# Patient Record
Sex: FEMALE
[De-identification: names, ages, dates, MRNs, and addresses within clinical notes are randomized per-mention and may not be internally consistent; named-entity substitution may affect disease eponyms.]

---

## 2019-06-03 PROCEDURE — 88305 TISSUE EXAM BY PATHOLOGIST: CPT | Performed by: RADIOLOGY

## 2019-06-03 PROCEDURE — 88342 IMHCHEM/IMCYTCHM 1ST ANTB: CPT | Performed by: RADIOLOGY

## 2019-06-03 PROCEDURE — 88360 TUMOR IMMUNOHISTOCHEM/MANUAL: CPT | Performed by: RADIOLOGY

## 2019-06-03 PROCEDURE — 88341 IMHCHEM/IMCYTCHM EA ADD ANTB: CPT | Performed by: RADIOLOGY

## 2019-06-21 ENCOUNTER — PRIOR ORIGINAL RECORDS (OUTPATIENT)
Dept: OTHER | Age: 59
End: 2019-06-21

## 2019-06-25 ENCOUNTER — NURSE NAVIGATOR ENCOUNTER (OUTPATIENT)
Dept: HEMATOLOGY/ONCOLOGY | Facility: HOSPITAL | Age: 59
End: 2019-06-25

## 2019-06-25 ENCOUNTER — OFFICE VISIT (OUTPATIENT)
Dept: SURGERY | Facility: CLINIC | Age: 59
End: 2019-06-25
Payer: MEDICAID

## 2019-06-25 ENCOUNTER — GENETICS ENCOUNTER (OUTPATIENT)
Dept: GENETICS | Facility: HOSPITAL | Age: 59
End: 2019-06-25
Attending: GENETIC COUNSELOR, MS
Payer: MEDICAID

## 2019-06-25 ENCOUNTER — TELEPHONE (OUTPATIENT)
Dept: HEMATOLOGY/ONCOLOGY | Facility: HOSPITAL | Age: 59
End: 2019-06-25

## 2019-06-25 VITALS
DIASTOLIC BLOOD PRESSURE: 79 MMHG | SYSTOLIC BLOOD PRESSURE: 135 MMHG | WEIGHT: 157 LBS | RESPIRATION RATE: 16 BRPM | HEART RATE: 58 BPM | OXYGEN SATURATION: 99 % | BODY MASS INDEX: 28 KG/M2

## 2019-06-25 DIAGNOSIS — R92.8 ABNORMAL MAMMOGRAM OF RIGHT BREAST: Primary | ICD-10-CM

## 2019-06-25 DIAGNOSIS — D05.11 BREAST NEOPLASM, TIS (DCIS), RIGHT: ICD-10-CM

## 2019-06-25 PROCEDURE — 99245 OFF/OP CONSLTJ NEW/EST HI 55: CPT | Performed by: SURGERY

## 2019-06-25 NOTE — TELEPHONE ENCOUNTER
Attempted to reach patient via language line (ID # [id number]) to discuss MRI appointment. Pt had requested Saturday appointment to be able to accommodate her work schedule. No availability this Saturday, but next Saturday July 6th is available.  MRI was schedu

## 2019-06-25 NOTE — PROGRESS NOTES
Met with patient and her son in clinic, patient's son translated. Introduced myself as the breast navigator nurse and explained my role and how I will work with all of the physicians involved in her care.  Explained the role of all of the physicians involve

## 2019-06-26 ENCOUNTER — TELEPHONE (OUTPATIENT)
Dept: HEMATOLOGY/ONCOLOGY | Facility: HOSPITAL | Age: 59
End: 2019-06-26

## 2019-06-26 NOTE — PROGRESS NOTES
Referring Provider: Dr. Daniel Fish          Reason for Referral:  Ms. Klaudia Jordan was referred for genetic counseling because of a new diagnosis of DCIS of the right breast at age 63. Ms. Katlyn Brewer is a 63year-old woman from [place].   She w Assessment:   Ms. Catrachito Zuniga meets NCCN guidelines for genetic testing for BRCA1/2 given a personal history of breast cancer, with a first degree relative with male  breast cancer. Genetic Testing (Panel):   The pros, cons, and limitations of genetic 50% chance of carrying the same variant. At-risk adults (>18) would have the option of pursuing targeted genetic testing at a reduced cost to clarify their cancer risks. Genetic test results have implications for the entire biological family.  Thus, it is r

## 2019-06-26 NOTE — H&P (VIEW-ONLY)
[facility]    Patient Name:  Dinesh Gresham   YOB: 1960   Gender:  Female   Appt Date:  6/25/2019   Provider:  Wilma Mcmahon MD   Insurance:  [address] [facility] This is a very pleasant 52-year-old female who is being evaluated today for the above reason. History of this present illness dates back to 5/9/2019 when the patient underwent bilateral screening mammograms with tomosynthesis.   Those returned to show the Vital Signs:  /79 (BP Location: Right arm, Patient Position: Sitting, Cuff Size: adult)   Pulse 58   Resp 16   Wt 71.2 kg (157 lb)   LMP 08/04/2012   SpO2 99%   BMI 28.03 kg/m²      Medications Reviewed:    Current Outpatient Medications:   •  Prav Psychiatric/Behavioral: Negative for confusion and agitation. Physical Examination:  Physical Exam    Constitutional: She is oriented to person, place, and time. She appears well-developed and well-nourished. HENT:   Head: Normocephalic.    Eyes: Pu was placed. Post biopsy mammogram, done on a dedicated unit, demonstrates the clip at the biopsy  site. Calcifications were identified in the tissue specimens.   *Using usual sterile technique and under stereotactic guidance, multiple core biopsies were obt -Largest focus of DCIS measures 8 mm (0.8 cm); approximately 30% of submitted tissue.         -Ductal carcinoma is associated with focal necrosis, microcalcifications and a radial sclerosing lesion and an intraductal papilloma.          -See comment. Methodology:         Immunohistochemical stains are performed on formalin-fixed, paraffin-embedded tissue sections. Deparaffinization, antigen retrieval, and staining utilizes the automated Leica Lugo III immunohistochemistry platform.   A proprietary, non Quantitative Immunohistochemistry:   Material:  Block B2  Population:   Tumor Cells     Antibody & Clone              Result  Interpretation   Estrogen Receptor   -   6F11 100 % Positive Cells Strong Positive   Progesterone Receptor   -   16 100 % Positive A- Labeled with the patient's name and medical record number, right breast stereotactic biopsy for calcifications at 11 o'clock, anterior depth, one container, gauge not specified, received in formalin: the specimen consists of multiple fragmented fibrofat We will present her case in our multidisciplinary tumor board next week. Crystal Young MD  [facility] General Surgical Oncology  [facility] 112  Pager [phone]  [email or web address]          Follow Up:  No follow-ups on file.        Electronicall

## 2019-06-26 NOTE — TELEPHONE ENCOUNTER
Assisted patient in re scheduling MRI, per her request. Scheduled for 7/1 at [place] location. Phoned pt with  line- ID # [id number]- confirmed time and location- the [place] location.  Contact information provided should the patient have any ot

## 2019-06-27 ENCOUNTER — TELEPHONE (OUTPATIENT)
Dept: HEMATOLOGY/ONCOLOGY | Facility: HOSPITAL | Age: 59
End: 2019-06-27

## 2019-06-27 NOTE — TELEPHONE ENCOUNTER
Spoke with [facility] release of information. They are currently still processing our request from 6/21 and will try to send the images on discs by 7/1.

## 2019-07-02 ENCOUNTER — TELEPHONE (OUTPATIENT)
Dept: SURGERY | Facility: CLINIC | Age: 59
End: 2019-07-02

## 2019-07-02 ENCOUNTER — DOCUMENTATION ONLY (OUTPATIENT)
Dept: SURGERY | Facility: CLINIC | Age: 59
End: 2019-07-02

## 2019-07-02 DIAGNOSIS — Z01.818 PREOP TESTING: ICD-10-CM

## 2019-07-02 DIAGNOSIS — D05.11 BREAST NEOPLASM, TIS (DCIS), RIGHT: Primary | ICD-10-CM

## 2019-07-02 NOTE — TELEPHONE ENCOUNTER
Aldoamanda Dunnbro, son called hoping to schedule surgery. Tentative date of 7-16-19 at [facility] given. He is aware Dr Sade Shukla is in [address] but I will discuss exact details of surgery and then update the pt.   I will call her using [employer]  [phone]

## 2019-07-03 ENCOUNTER — TELEPHONE (OUTPATIENT)
Dept: HEMATOLOGY/ONCOLOGY | Facility: HOSPITAL | Age: 59
End: 2019-07-03

## 2019-07-03 NOTE — TELEPHONE ENCOUNTER
Left a voicemail for patient's son, Daniel Rojas, regarding appointment with Dr. Zayra Griffin. Also phoned the patient's cell phone with the language line- ID # [id number].  Patient answered the phone and we discussed the date, time and location for appointment with

## 2019-07-09 ENCOUNTER — OFFICE VISIT (OUTPATIENT)
Dept: SURGERY | Facility: CLINIC | Age: 59
End: 2019-07-09
Payer: MEDICAID

## 2019-07-09 DIAGNOSIS — D05.11 DUCTAL CARCINOMA IN SITU (DCIS) OF RIGHT BREAST: Primary | ICD-10-CM

## 2019-07-09 PROCEDURE — 99203 OFFICE O/P NEW LOW 30 MIN: CPT | Performed by: SURGERY

## 2019-07-09 NOTE — PROGRESS NOTES
Surgeon: Dr. Padna Mauricio      Tel:  [phone]    Fax: [phone]     Surgery/Procedure: Immediate right breast tissue expander placement with acellular dermal matrix    Joint with Dr. Cherelle Villegas, 1.5 hours       Hospital:  [facility]: [address] [place] [address]
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
## 2019-07-09 NOTE — PROGRESS NOTES
PT given pre-op instructions for surgery via [employer] interpretor. PT was given surgical scrub wash and instructions. Pt instructed to see PCP for medical clearance prior to surgery, pt states she saw her PCP Dr. Arline Dc already for clearance.  Surgery to

## 2019-07-10 ENCOUNTER — GENETICS ENCOUNTER (OUTPATIENT)
Dept: HEMATOLOGY/ONCOLOGY | Facility: HOSPITAL | Age: 59
End: 2019-07-10

## 2019-07-10 DIAGNOSIS — D05.11 DUCTAL CARCINOMA IN SITU (DCIS) OF RIGHT BREAST: Primary | ICD-10-CM

## 2019-07-10 NOTE — PROGRESS NOTES
Referring Provider: Dr. Rigoberto Kumar          Reason for Referral:  Ms. Jona Bishop had [facility] Breast Cancer STAT panel genetic testing performed on 06/25/2019 because of a personal and family history of breast cancer.        Genetic Testing Resul

## 2019-07-12 ENCOUNTER — TELEPHONE (OUTPATIENT)
Dept: SURGERY | Facility: CLINIC | Age: 59
End: 2019-07-12

## 2019-07-12 NOTE — TELEPHONE ENCOUNTER
Called pt to schedule surgery using the [employer],  #[phone]  Dr Slade Wiggins is available for joint case on Wednesday 7-24-19, first case 0730  Lm on  to ; main dept # given as call back.

## 2019-07-12 NOTE — TELEPHONE ENCOUNTER
Son returned my call; confirmed OR at [facility] on 7/24/19. Verbalized understanding and agrees to this plan. Hospital address and main phone # given as reference.

## 2019-07-19 ENCOUNTER — TELEPHONE (OUTPATIENT)
Dept: SURGERY | Facility: CLINIC | Age: 59
End: 2019-07-19

## 2019-07-19 NOTE — TELEPHONE ENCOUNTER
Called pt to confirm she has seen new PCP for medical clearance. Call placed with Language Line #[phone], Jos Coyle.  unable to lm on vm as it was not functioning. Called other contact phone number, pt states she is too busy to talk to me now.   Re

## 2019-07-19 NOTE — TELEPHONE ENCOUNTER
Son returned my call stating he believed Dr Simran Nieves was going to speak with Dr Amilcar Garcia directly re: medical clearance. He gave me her phone number:  [phone]; I tried to call; no answer, no vm. Will call again on Monday.

## 2019-07-19 NOTE — TELEPHONE ENCOUNTER
Called pt again with language line,  #[phone], Enmanuel Rubio; lm on vm to cb on Monday. My direct call back # given. Per Lesvia's TE 7-9-19, pt reported medical clearance per Dr Adrián Kapadia. Nothing in Epic. Need to confirm with pt.

## 2019-07-22 NOTE — TELEPHONE ENCOUNTER
Called Dr Montez Del Rio office, spoke to Gilda greenwood who states she does have clearance and will fax it to this office asap. Received via fax, medical clearance. Will forward to PAT and Plastics, then to Scan. Confirmations received on both faxes above.   Ready for

## 2019-07-23 ENCOUNTER — HOSPITAL ENCOUNTER (OUTPATIENT)
Dept: NUCLEAR MEDICINE | Facility: HOSPITAL | Age: 59
Discharge: HOME | End: 2019-07-23
Attending: SURGERY
Payer: MEDICAID

## 2019-07-23 ENCOUNTER — TELEPHONE (OUTPATIENT)
Dept: SURGERY | Facility: CLINIC | Age: 59
End: 2019-07-23

## 2019-07-23 DIAGNOSIS — D05.11 BREAST NEOPLASM, TIS (DCIS), RIGHT: ICD-10-CM

## 2019-07-23 PROCEDURE — 78195 LYMPH SYSTEM IMAGING: CPT | Performed by: SURGERY

## 2019-07-23 RX ORDER — HEPARIN SODIUM 5000 [USP'U]/ML
5000 INJECTION, SOLUTION INTRAVENOUS; SUBCUTANEOUS EVERY 8 HOURS SCHEDULED
Status: CANCELLED | OUTPATIENT
Start: 2019-07-23

## 2019-07-23 NOTE — TELEPHONE ENCOUNTER
Received a call from Karishma Mas in [place] stating pt did not show for her lymphoscintigraphy today. Called pt using  #[phone], Haja Flowers lm on  URGENT for pt to cb. My direct call back # given.   Called home #; using same , states she is not

## 2019-07-24 ENCOUNTER — HOSPITAL ENCOUNTER (OUTPATIENT)
Facility: HOSPITAL | Age: 59
Discharge: HOME | End: 2019-07-25
Attending: SURGERY | Admitting: SURGERY
Payer: MEDICAID

## 2019-07-24 ENCOUNTER — ANESTHESIA EVENT (OUTPATIENT)
Dept: SURGERY | Facility: HOSPITAL | Age: 59
End: 2019-07-24
Payer: MEDICAID

## 2019-07-24 ENCOUNTER — ANESTHESIA (OUTPATIENT)
Dept: SURGERY | Facility: HOSPITAL | Age: 59
End: 2019-07-24
Payer: MEDICAID

## 2019-07-24 DIAGNOSIS — D05.11 DUCTAL CARCINOMA IN SITU (DCIS) OF RIGHT BREAST: Primary | ICD-10-CM

## 2019-07-24 PROBLEM — D05.90 BREAST CANCER IN SITU: Status: ACTIVE | Noted: 2019-07-24

## 2019-07-24 PROBLEM — Z90.11 ABSENCE OF BREAST, RIGHT: Status: ACTIVE | Noted: 2019-07-24

## 2019-07-24 LAB
ANION GAP SERPL CALC-SCNC: 9 MMOL/L (ref 0–18)
BUN BLD-MCNC: 15 MG/DL (ref 7–18)
BUN/CREAT SERPL: 18.3 (ref 10–20)
CALCIUM BLD-MCNC: 8.8 MG/DL (ref 8.5–10.1)
CHLORIDE SERPL-SCNC: 109 MMOL/L (ref 98–112)
CO2 SERPL-SCNC: 22 MMOL/L (ref 21–32)
CREAT BLD-MCNC: 0.82 MG/DL (ref 0.55–1.02)
GLUCOSE BLD-MCNC: 139 MG/DL (ref 70–99)
OSMOLALITY SERPL CALC.SUM OF ELEC: 293 MOSM/KG (ref 275–295)
PATIENT FASTING: YES
POTASSIUM SERPL-SCNC: 4 MMOL/L (ref 3.5–5.1)
SODIUM SERPL-SCNC: 140 MMOL/L (ref 136–145)

## 2019-07-24 PROCEDURE — 99204 OFFICE O/P NEW MOD 45 MIN: CPT | Performed by: HOSPITALIST

## 2019-07-24 PROCEDURE — 0HHT0NZ INSERTION OF TISSUE EXPANDER INTO RIGHT BREAST, OPEN APPROACH: ICD-10-PCS | Performed by: SURGERY

## 2019-07-24 PROCEDURE — 3E0W3KZ INTRODUCTION OF OTHER DIAGNOSTIC SUBSTANCE INTO LYMPHATICS, PERCUTANEOUS APPROACH: ICD-10-PCS | Performed by: SURGERY

## 2019-07-24 PROCEDURE — 07B50ZX EXCISION OF RIGHT AXILLARY LYMPHATIC, OPEN APPROACH, DIAGNOSTIC: ICD-10-PCS | Performed by: SURGERY

## 2019-07-24 PROCEDURE — 0HTT0ZZ RESECTION OF RIGHT BREAST, OPEN APPROACH: ICD-10-PCS | Performed by: SURGERY

## 2019-07-24 RX ORDER — HYDROCODONE BITARTRATE AND ACETAMINOPHEN 5; 325 MG/1; MG/1
1-2 TABLET ORAL EVERY 6 HOURS PRN
Status: DISCONTINUED | OUTPATIENT
Start: 2019-07-24 | End: 2019-07-25

## 2019-07-24 RX ORDER — FONDAPARINUX SODIUM 2.5 MG/.5ML
2.5 INJECTION SUBCUTANEOUS DAILY
Status: DISCONTINUED | OUTPATIENT
Start: 2019-07-25 | End: 2019-07-25

## 2019-07-24 RX ORDER — HYDROCODONE BITARTRATE AND ACETAMINOPHEN 5; 325 MG/1; MG/1
2 TABLET ORAL AS NEEDED
Status: COMPLETED | OUTPATIENT
Start: 2019-07-24 | End: 2019-07-24

## 2019-07-24 RX ORDER — HYDROMORPHONE HYDROCHLORIDE 1 MG/ML
0.6 INJECTION, SOLUTION INTRAMUSCULAR; INTRAVENOUS; SUBCUTANEOUS EVERY 5 MIN PRN
Status: DISCONTINUED | OUTPATIENT
Start: 2019-07-24 | End: 2019-07-24 | Stop reason: HOSPADM

## 2019-07-24 RX ORDER — ONDANSETRON 2 MG/ML
INJECTION INTRAMUSCULAR; INTRAVENOUS AS NEEDED
Status: DISCONTINUED | OUTPATIENT
Start: 2019-07-24 | End: 2019-07-24 | Stop reason: SURG

## 2019-07-24 RX ORDER — CEFAZOLIN SODIUM/WATER 2 G/20 ML
2 SYRINGE (ML) INTRAVENOUS ONCE
Status: COMPLETED | OUTPATIENT
Start: 2019-07-24 | End: 2019-07-24

## 2019-07-24 RX ORDER — ROCURONIUM BROMIDE 10 MG/ML
INJECTION, SOLUTION INTRAVENOUS AS NEEDED
Status: DISCONTINUED | OUTPATIENT
Start: 2019-07-24 | End: 2019-07-24 | Stop reason: SURG

## 2019-07-24 RX ORDER — MORPHINE SULFATE 4 MG/ML
4 INJECTION, SOLUTION INTRAMUSCULAR; INTRAVENOUS EVERY 10 MIN PRN
Status: DISCONTINUED | OUTPATIENT
Start: 2019-07-24 | End: 2019-07-24 | Stop reason: HOSPADM

## 2019-07-24 RX ORDER — DIPHENHYDRAMINE HCL 25 MG
25 CAPSULE ORAL EVERY 4 HOURS PRN
Status: DISCONTINUED | OUTPATIENT
Start: 2019-07-24 | End: 2019-07-25

## 2019-07-24 RX ORDER — MORPHINE SULFATE 4 MG/ML
2 INJECTION, SOLUTION INTRAMUSCULAR; INTRAVENOUS EVERY 10 MIN PRN
Status: DISCONTINUED | OUTPATIENT
Start: 2019-07-24 | End: 2019-07-24 | Stop reason: HOSPADM

## 2019-07-24 RX ORDER — ONDANSETRON 2 MG/ML
4 INJECTION INTRAMUSCULAR; INTRAVENOUS EVERY 6 HOURS PRN
Status: DISCONTINUED | OUTPATIENT
Start: 2019-07-24 | End: 2019-07-25

## 2019-07-24 RX ORDER — METOCLOPRAMIDE HYDROCHLORIDE 5 MG/ML
10 INJECTION INTRAMUSCULAR; INTRAVENOUS EVERY 6 HOURS PRN
Status: DISCONTINUED | OUTPATIENT
Start: 2019-07-24 | End: 2019-07-25

## 2019-07-24 RX ORDER — DOCUSATE SODIUM 100 MG/1
100 CAPSULE, LIQUID FILLED ORAL 2 TIMES DAILY
Status: DISCONTINUED | OUTPATIENT
Start: 2019-07-25 | End: 2019-07-25

## 2019-07-24 RX ORDER — NEOSTIGMINE METHYLSULFATE 0.5 MG/ML
INJECTION INTRAVENOUS AS NEEDED
Status: DISCONTINUED | OUTPATIENT
Start: 2019-07-24 | End: 2019-07-24 | Stop reason: SURG

## 2019-07-24 RX ORDER — HYDROCODONE BITARTRATE AND ACETAMINOPHEN 5; 325 MG/1; MG/1
1 TABLET ORAL AS NEEDED
Status: COMPLETED | OUTPATIENT
Start: 2019-07-24 | End: 2019-07-24

## 2019-07-24 RX ORDER — HEPARIN SODIUM 5000 [USP'U]/ML
5000 INJECTION, SOLUTION INTRAVENOUS; SUBCUTANEOUS ONCE
Status: COMPLETED | OUTPATIENT
Start: 2019-07-24 | End: 2019-07-24

## 2019-07-24 RX ORDER — DOCUSATE SODIUM 100 MG/1
100 CAPSULE, LIQUID FILLED ORAL 2 TIMES DAILY
Qty: 40 CAPSULE | Refills: 0 | Status: SHIPPED | OUTPATIENT
Start: 2019-07-24 | End: 2019-08-23 | Stop reason: CLARIF

## 2019-07-24 RX ORDER — LIDOCAINE HYDROCHLORIDE 10 MG/ML
INJECTION, SOLUTION EPIDURAL; INFILTRATION; INTRACAUDAL; PERINEURAL AS NEEDED
Status: DISCONTINUED | OUTPATIENT
Start: 2019-07-24 | End: 2019-07-24 | Stop reason: SURG

## 2019-07-24 RX ORDER — ONDANSETRON 4 MG/1
4 TABLET, FILM COATED ORAL EVERY 8 HOURS PRN
Qty: 20 TABLET | Refills: 1 | Status: SHIPPED | OUTPATIENT
Start: 2019-07-24 | End: 2019-07-31 | Stop reason: ALTCHOICE

## 2019-07-24 RX ORDER — NALOXONE HYDROCHLORIDE 0.4 MG/ML
80 INJECTION, SOLUTION INTRAMUSCULAR; INTRAVENOUS; SUBCUTANEOUS AS NEEDED
Status: DISCONTINUED | OUTPATIENT
Start: 2019-07-24 | End: 2019-07-24 | Stop reason: HOSPADM

## 2019-07-24 RX ORDER — MORPHINE SULFATE 4 MG/ML
4 INJECTION, SOLUTION INTRAMUSCULAR; INTRAVENOUS
Status: DISCONTINUED | OUTPATIENT
Start: 2019-07-24 | End: 2019-07-25

## 2019-07-24 RX ORDER — ENOXAPARIN SODIUM 100 MG/ML
40 INJECTION SUBCUTANEOUS DAILY
Status: DISCONTINUED | OUTPATIENT
Start: 2019-07-25 | End: 2019-07-24

## 2019-07-24 RX ORDER — PRAVASTATIN SODIUM 20 MG
20 TABLET ORAL DAILY
Status: DISCONTINUED | OUTPATIENT
Start: 2019-07-25 | End: 2019-07-25

## 2019-07-24 RX ORDER — DIPHENHYDRAMINE HYDROCHLORIDE 50 MG/ML
12.5 INJECTION INTRAMUSCULAR; INTRAVENOUS EVERY 4 HOURS PRN
Status: DISCONTINUED | OUTPATIENT
Start: 2019-07-24 | End: 2019-07-25

## 2019-07-24 RX ORDER — ONDANSETRON 4 MG/1
4 TABLET, ORALLY DISINTEGRATING ORAL EVERY 6 HOURS PRN
Status: DISCONTINUED | OUTPATIENT
Start: 2019-07-24 | End: 2019-07-25

## 2019-07-24 RX ORDER — HYDROMORPHONE HYDROCHLORIDE 1 MG/ML
0.4 INJECTION, SOLUTION INTRAMUSCULAR; INTRAVENOUS; SUBCUTANEOUS EVERY 5 MIN PRN
Status: DISCONTINUED | OUTPATIENT
Start: 2019-07-24 | End: 2019-07-24 | Stop reason: HOSPADM

## 2019-07-24 RX ORDER — MORPHINE SULFATE 2 MG/ML
2 INJECTION, SOLUTION INTRAMUSCULAR; INTRAVENOUS
Status: DISCONTINUED | OUTPATIENT
Start: 2019-07-24 | End: 2019-07-25

## 2019-07-24 RX ORDER — CEPHALEXIN 500 MG/1
500 CAPSULE ORAL 4 TIMES DAILY
Qty: 40 CAPSULE | Refills: 2 | Status: SHIPPED | OUTPATIENT
Start: 2019-07-24 | End: 2019-08-16

## 2019-07-24 RX ORDER — LISINOPRIL 20 MG/1
20 TABLET ORAL DAILY
Status: DISCONTINUED | OUTPATIENT
Start: 2019-07-25 | End: 2019-07-25

## 2019-07-24 RX ORDER — HYDROMORPHONE HYDROCHLORIDE 1 MG/ML
0.2 INJECTION, SOLUTION INTRAMUSCULAR; INTRAVENOUS; SUBCUTANEOUS EVERY 5 MIN PRN
Status: DISCONTINUED | OUTPATIENT
Start: 2019-07-24 | End: 2019-07-24 | Stop reason: HOSPADM

## 2019-07-24 RX ORDER — PROCHLORPERAZINE EDISYLATE 5 MG/ML
5 INJECTION INTRAMUSCULAR; INTRAVENOUS ONCE AS NEEDED
Status: DISCONTINUED | OUTPATIENT
Start: 2019-07-24 | End: 2019-07-24 | Stop reason: HOSPADM

## 2019-07-24 RX ORDER — SODIUM CHLORIDE, SODIUM LACTATE, POTASSIUM CHLORIDE, CALCIUM CHLORIDE 600; 310; 30; 20 MG/100ML; MG/100ML; MG/100ML; MG/100ML
INJECTION, SOLUTION INTRAVENOUS CONTINUOUS
Status: DISCONTINUED | OUTPATIENT
Start: 2019-07-24 | End: 2019-07-24 | Stop reason: HOSPADM

## 2019-07-24 RX ORDER — MORPHINE SULFATE 10 MG/ML
6 INJECTION, SOLUTION INTRAMUSCULAR; INTRAVENOUS EVERY 10 MIN PRN
Status: DISCONTINUED | OUTPATIENT
Start: 2019-07-24 | End: 2019-07-24 | Stop reason: HOSPADM

## 2019-07-24 RX ORDER — METOCLOPRAMIDE 10 MG/1
10 TABLET ORAL EVERY 6 HOURS PRN
Status: DISCONTINUED | OUTPATIENT
Start: 2019-07-24 | End: 2019-07-25

## 2019-07-24 RX ORDER — GLYCOPYRROLATE 0.2 MG/ML
INJECTION INTRAMUSCULAR; INTRAVENOUS AS NEEDED
Status: DISCONTINUED | OUTPATIENT
Start: 2019-07-24 | End: 2019-07-24 | Stop reason: SURG

## 2019-07-24 RX ORDER — CEFAZOLIN SODIUM/WATER 2 G/20 ML
2 SYRINGE (ML) INTRAVENOUS EVERY 8 HOURS
Status: DISCONTINUED | OUTPATIENT
Start: 2019-07-24 | End: 2019-07-25

## 2019-07-24 RX ORDER — HALOPERIDOL 5 MG/ML
0.25 INJECTION INTRAMUSCULAR ONCE AS NEEDED
Status: DISCONTINUED | OUTPATIENT
Start: 2019-07-24 | End: 2019-07-24 | Stop reason: HOSPADM

## 2019-07-24 RX ORDER — ONDANSETRON 2 MG/ML
4 INJECTION INTRAMUSCULAR; INTRAVENOUS ONCE AS NEEDED
Status: DISCONTINUED | OUTPATIENT
Start: 2019-07-24 | End: 2019-07-24 | Stop reason: HOSPADM

## 2019-07-24 RX ORDER — METOCLOPRAMIDE 10 MG/1
10 TABLET ORAL ONCE
Status: DISCONTINUED | OUTPATIENT
Start: 2019-07-24 | End: 2019-07-24 | Stop reason: HOSPADM

## 2019-07-24 RX ORDER — ACETAMINOPHEN 500 MG
1000 TABLET ORAL ONCE
Status: COMPLETED | OUTPATIENT
Start: 2019-07-24 | End: 2019-07-24

## 2019-07-24 RX ORDER — SODIUM CHLORIDE, SODIUM LACTATE, POTASSIUM CHLORIDE, CALCIUM CHLORIDE 600; 310; 30; 20 MG/100ML; MG/100ML; MG/100ML; MG/100ML
INJECTION, SOLUTION INTRAVENOUS CONTINUOUS
Status: DISCONTINUED | OUTPATIENT
Start: 2019-07-24 | End: 2019-07-25

## 2019-07-24 RX ORDER — FAMOTIDINE 20 MG/1
20 TABLET ORAL ONCE
Status: DISCONTINUED | OUTPATIENT
Start: 2019-07-24 | End: 2019-07-24 | Stop reason: HOSPADM

## 2019-07-24 RX ORDER — MIDAZOLAM HYDROCHLORIDE 1 MG/ML
INJECTION INTRAMUSCULAR; INTRAVENOUS AS NEEDED
Status: DISCONTINUED | OUTPATIENT
Start: 2019-07-24 | End: 2019-07-24 | Stop reason: SURG

## 2019-07-24 RX ORDER — BUPIVACAINE HYDROCHLORIDE 5 MG/ML
INJECTION, SOLUTION EPIDURAL; INTRACAUDAL AS NEEDED
Status: DISCONTINUED | OUTPATIENT
Start: 2019-07-24 | End: 2019-07-24 | Stop reason: HOSPADM

## 2019-07-24 RX ORDER — HYDROCODONE BITARTRATE AND ACETAMINOPHEN 5; 325 MG/1; MG/1
1-2 TABLET ORAL EVERY 4 HOURS PRN
Qty: 40 TABLET | Refills: 0 | Status: SHIPPED | OUTPATIENT
Start: 2019-07-24 | End: 2019-07-31 | Stop reason: ALTCHOICE

## 2019-07-24 RX ORDER — MORPHINE SULFATE 4 MG/ML
8 INJECTION, SOLUTION INTRAMUSCULAR; INTRAVENOUS
Status: DISCONTINUED | OUTPATIENT
Start: 2019-07-24 | End: 2019-07-25

## 2019-07-24 RX ADMIN — CEFAZOLIN SODIUM/WATER 2 G: 2 G/20 ML SYRINGE (ML) INTRAVENOUS at 08:09:00

## 2019-07-24 RX ADMIN — NEOSTIGMINE METHYLSULFATE 3.5 MG: 0.5 INJECTION INTRAVENOUS at 11:00:00

## 2019-07-24 RX ADMIN — ONDANSETRON 4 MG: 2 INJECTION INTRAMUSCULAR; INTRAVENOUS at 10:59:00

## 2019-07-24 RX ADMIN — MIDAZOLAM HYDROCHLORIDE 2 MG: 1 INJECTION INTRAMUSCULAR; INTRAVENOUS at 07:33:00

## 2019-07-24 RX ADMIN — GLYCOPYRROLATE 0.7 MG: 0.2 INJECTION INTRAMUSCULAR; INTRAVENOUS at 11:00:00

## 2019-07-24 RX ADMIN — ROCURONIUM BROMIDE 40 MG: 10 INJECTION, SOLUTION INTRAVENOUS at 07:39:00

## 2019-07-24 RX ADMIN — SODIUM CHLORIDE, SODIUM LACTATE, POTASSIUM CHLORIDE, CALCIUM CHLORIDE: 600; 310; 30; 20 INJECTION, SOLUTION INTRAVENOUS at 11:18:00

## 2019-07-24 RX ADMIN — ROCURONIUM BROMIDE 10 MG: 10 INJECTION, SOLUTION INTRAVENOUS at 08:37:00

## 2019-07-24 RX ADMIN — LIDOCAINE HYDROCHLORIDE 25 MG: 10 INJECTION, SOLUTION EPIDURAL; INFILTRATION; INTRACAUDAL; PERINEURAL at 07:38:00

## 2019-07-24 NOTE — BRIEF OP NOTE
Pre-Operative Diagnosis: ductal carcinoma in-situ of right breast     Post-Operative Diagnosis: ductal carcinoma in-situ of right breast      Procedure Performed:   Procedure(s):  Right breast mastectomy with right sentinel lymph node biopsy with lymphosci

## 2019-07-24 NOTE — ANESTHESIA POSTPROCEDURE EVALUATION
Patient: Rehan Lindsay    Procedure Summary     Date:  07/24/19 Room / Location:  [address] [place] [address] MAIN OR 04 / [address] [place] [address] MAIN OR    Anesthesia Start:  0732 Anesthesia Stop:  1118    Procedures:       BREAST RECONSTRUCTION/ IMMEDIATE/EXPANDER (Right Breast)      BREAST M

## 2019-07-24 NOTE — INTERVAL H&P NOTE
Patient seen and examined. No changes to the attached history and physical are noted. 63year old female presenting today for planned right mastectomy and SLNB.     Jazmin Conner MD  [facility] General Surgical Oncology  [facility] 112  Pager [phone]

## 2019-07-24 NOTE — OPERATIVE REPORT
[facility]    PATIENT'S NAME: True Kingsley   ATTENDING PHYSICIAN: Caitlin Pardo MD   OPERATING PHYSICIAN: James Carreon MD   PATIENT ACCOUNT#:   [id number]    LOCATION:  [facility] [address] PACU [address] [place] [address]  MEDICAL RECORD #:   [id number]       DATE underwent a right skin-sparing mastectomy. This will be dictated in a separate note. While the mastectomy was being completed, the AlloDerm/tissue expander construct was assembled on the back table.   Two sheets of large contoured, perforated AlloDerm wer to the skin with 3-0 nylon suture. The superior mastectomy skin flap appeared somewhat contused, and these margins were sharply excised and sent for permanent pathologic analysis.   The expander was then accessed and filled with 240 mL of air, which placed

## 2019-07-24 NOTE — ANESTHESIA PREPROCEDURE EVALUATION
Anesthesia PreOp Note    HPI:     Vanita Grimes is a 63year old female who presents for preoperative consultation requested by: Melanie Harper MD    Date of Surgery: 7/24/2019    Procedure(s):  BREAST RECONSTRUCTION/ IMMEDIATE/EXPANDER  BREAST MASTECT status:       Spouse name: Not on file      Number of children: Not on file      Years of education: Not on file      Highest education level: Not on file    Occupational History      Not on file    Social Needs      Financial resource strain: Not o Airway   Mallampati: I  TM distance: >3 FB  Neck ROM: full  Dental - normal exam     Pulmonary - normal exam   Cardiovascular - normal exam  (+) hypertension,     Neuro/Psych      GI/Hepatic/Renal      Endo/Other    Abdominal  - normal exam               A

## 2019-07-24 NOTE — PROGRESS NOTES
Plan for right SS-mastectomy by Isidoro and immediate reconstruction with tissue expander and acellular dermal matrix reviewed with patient and son.  The risks of surgery including but not limited to bleeding, infection, scarring, delayed wound healing, se

## 2019-07-24 NOTE — ANESTHESIA PROCEDURE NOTES
Airway  Date/Time: 7/24/2019 7:41 AM  Urgency: elective    Airway not difficult    General Information and Staff    Patient location during procedure: OR  Anesthesiologist: Lily Osei MD  Resident/CRNA: Xiomy Cardoza CRNA  Performed: CRNA

## 2019-07-24 NOTE — PLAN OF CARE
RECEIVED PT FROM PACU- VSS, DROWSY BUT EASILY AROUSABLE , C/O OF PAIN IN RIGHT ARM, PRN MORPHINE GIVEN, NEELIMA DRAINS MAINTAINED, SURGICAL BRA IN PLACE ABT GIVEN AND IVF CONTINUED, SCDS ON, AMBULATING WITH STAND BY ASSIST, FAMILY AT BEDSIDE ALL NEEDS MET AT [address]

## 2019-07-24 NOTE — PROGRESS NOTES
[facility]    Progress Note    Stiven Rodriguez Patient Status:  Outpatient in a Bed    1960 MRN [id number]   Location [address] [facility] Attending Luh Brennan MD   Hosp Day # 0 [facility] Immediate right breast tissue expander placement with acellular dermal matrix (Matthew). PAIN CONTROL, MONITOR WOUND AND DRAINS, DVT PROPHYLAXIS, PATH PENDING. HTN (hypertension)  CONT HOME MEDS, MONITOR.        Hypercholesterolemia  CONT the procedure with the patient, obtain time-out, and answered patient's questions prior to the procedure. The patient, nuclear medicine technologist assisting with this exam, and myself were present during the discussion with the language-line.   Dictated

## 2019-07-25 ENCOUNTER — NURSE NAVIGATOR ENCOUNTER (OUTPATIENT)
Dept: HEMATOLOGY/ONCOLOGY | Facility: HOSPITAL | Age: 59
End: 2019-07-25

## 2019-07-25 ENCOUNTER — TELEPHONE (OUTPATIENT)
Dept: HEMATOLOGY/ONCOLOGY | Facility: HOSPITAL | Age: 59
End: 2019-07-25

## 2019-07-25 VITALS
HEIGHT: 62 IN | RESPIRATION RATE: 19 BRPM | TEMPERATURE: 97 F | WEIGHT: 157 LBS | HEART RATE: 88 BPM | OXYGEN SATURATION: 95 % | BODY MASS INDEX: 28.89 KG/M2 | DIASTOLIC BLOOD PRESSURE: 51 MMHG | SYSTOLIC BLOOD PRESSURE: 103 MMHG

## 2019-07-25 PROCEDURE — 99214 OFFICE O/P EST MOD 30 MIN: CPT | Performed by: HOSPITALIST

## 2019-07-25 NOTE — PLAN OF CARE
Problem: Patient Centered Care  Goal: Patient preferences are identified and integrated in the patient's plan of care  Description  Interventions:  - What would you like us to know as we care for you?   - Provide timely, complete, and accurate informatio pain and pain management  - Manage/alleviate anxiety  - Utilize distraction and/or relaxation techniques  - Monitor for opioid side effects  - Notify MD/LIP if interventions unsuccessful or patient reports new pain  - Anticipate increased pain with activit (undernourished)  Description  INTERVENTIONS:  - Monitor percentage of each meal consumed  - Identify factors contributing to decreased intake, treat as appropriate  - Assist with meals as needed  - Monitor I&O, WT and lab values  - Obtain nutritional cons

## 2019-07-25 NOTE — PROGRESS NOTES
Pain well-controlled  Drains 178cc in 24hrs  Awake and alert  Mastectomy skin well-perfused  Drain effluent serosanguinous  A/P:  POD# 1  Doing well  D/c home later today  Home care instructions reviewed with pt and daughter and questions answered

## 2019-07-25 NOTE — PLAN OF CARE
VSS, PAIN MANAGED WITH PRN NORCO, TOLERATING DIET, VOIDING FREELY, NEELIMA DRAINS MAINTAINED, AMBULATING WITH ASSISTANCE, RIGHT ARM PRECAUTIONS MAINTAINED, SURGICAL BRA IN PLACE. EDUCATION PROVIDED FOR FOR DRESSING AND DRAIN MANAGEMENT. FAMILY AT BEDSIDE.  LOYD response  - Implement non-pharmacological measures as appropriate and evaluate response  - Consider cultural and social influences on pain and pain management  - Manage/alleviate anxiety  - Utilize distraction and/or relaxation techniques  - Monitor for op values  - Obtain nutritional consult as needed  - Optimize oral hygiene and moisture  - Encourage food from home; allow for food preferences  - Enhance eating environment  Outcome: Adequate for Discharge  Goal: Achieves appropriate nutritional intake (anna

## 2019-07-25 NOTE — TELEPHONE ENCOUNTER
Phoned patient using the interpretor line (ID # [id number]). Pt's son answered. Appointment for follow up with Dr. Aida Dow has been scheduled for July 31st at 9 AM at the [place] location.  Patient's son states that this works for them, confirm time and loc

## 2019-07-25 NOTE — PROGRESS NOTES
Patient is POD #1 s/p right breast mastectomy with right sentinel lymph node biopsy, and immediate reconstruction with tissue expander placement. Patient is supported by her family, including her spouse, son, and daughter.   Patient is up and ambulatory,

## 2019-07-25 NOTE — DISCHARGE SUMMARY
[facility]    Discharge Summary    Pennygina Burns Patient Status:  Outpatient in a Bed    1960 MRN [id number]   Location [facility] 4W/SW/SE Attending Jorge Levy MD   Hosp Day # 0 PCP No primary care provider on file. Provider Role Specialty    Sandford Duane, MD Consulting Physician  HOSPITALIST        Surgical Procedures     Case IDs Date Procedure Surgeon Location Status    [id number] 7/24/19 BREAST RECONSTRUCTION/ IMMEDIATE/EXPANDER Donald Infante MD [facility] OR [facility] Tabs  Commonly known as:  PRINIVIL,ZESTRIL      Take 1 Tab by mouth daily. Quantity:  90 Tab  Refills:  0     Pravastatin Sodium 20 MG Tabs  Commonly known as:  PRAVACHOL      Take 1 Tab by mouth daily.    Quantity:  90 Tab  Refills:  0           Where to and you are more comfortable in that rather than the surgical bra. No underwire.    · Reinforce the dressing with insertion of additional padding as needed - this is not required - for your comfort only  ·   Drain Care  Proper drain care is an important par course of the antibiotic, you need to continue refilling this until your drains are completely removed.    · Fill the prescription as directed by Easton Pena PA-C  · Follow the instructions as written on the bottle's label  · Call Dr. Swapnil Moore office, i numbness, tingling, bleeding, fever, or other concerns. If he/she is not available, another physician will be able to address your concerns. Jackelin   Thank you for coming to [facility] for your operation.   The nurses and the anesthesiologist try shoulder pain or discomfort on the day of surgery. · For some patients to have nausea after surgery/anesthesia    If you feel nausea or experience vomiting:   · Try to move around less.    · Eat less than usual or drink only liquids until the next morning

## 2019-07-26 NOTE — OPERATIVE REPORT
Date of operation 7/24/2019    Preoperative diagnosis:  1. Right breast extensive DCIS    Operation performed:  1. Right sided skin sparing total mastectomy  2. Excision sentinel lymph node, deep axillary  3.   Identification of sentinel lymph node using 10 cc of methylene blue intermixed with normal saline at a one-to-one ratio were infiltrated into the subareolar region. Breast massaging was then undertaken for 5 minutes total.  A circumareolar skin incision was then made.   Flaps were developed in the a

## 2019-07-31 ENCOUNTER — OFFICE VISIT (OUTPATIENT)
Dept: SURGERY | Facility: CLINIC | Age: 59
End: 2019-07-31
Payer: MEDICAID

## 2019-07-31 ENCOUNTER — NURSE NAVIGATOR ENCOUNTER (OUTPATIENT)
Dept: HEMATOLOGY/ONCOLOGY | Facility: HOSPITAL | Age: 59
End: 2019-07-31

## 2019-07-31 VITALS
HEART RATE: 77 BPM | DIASTOLIC BLOOD PRESSURE: 72 MMHG | TEMPERATURE: 97 F | BODY MASS INDEX: 29 KG/M2 | WEIGHT: 156 LBS | OXYGEN SATURATION: 99 % | SYSTOLIC BLOOD PRESSURE: 117 MMHG | RESPIRATION RATE: 16 BRPM

## 2019-07-31 DIAGNOSIS — D05.11 DUCTAL CARCINOMA IN SITU (DCIS) OF RIGHT BREAST: Primary | ICD-10-CM

## 2019-07-31 PROCEDURE — 99024 POSTOP FOLLOW-UP VISIT: CPT | Performed by: SURGERY

## 2019-07-31 NOTE — PROGRESS NOTES
[facility]    Patient Name:  Aydee Bishop   YOB: 1960   Gender:  Female   Appt Date: 7/31/2019   Provider:  Wilber Norton MD   Insurance:  [address] [place] [address] 5/24/2019, the patient underwent right-sided diagnostic mammogram with tomosynthesis concurrent with right breast ultrasound. That returned to show a new 4.3 cm region of microcalcifications without an ultrasonographic correlate.   2 site stereotactic guid · All inked margins are free of carcinoma. · No definitive evidence of invasive carcinoma identified. · Preliminary pathologic staging pTis, N0(sn)     B. Right breast sentinel lymph node #1:   · Single lymph node, negative for carcinoma (0/1).   · Immuno •  Pravastatin Sodium 20 MG Oral Tab, Take 1 Tab by mouth daily. , Disp: 90 Tab, Rfl: 0  •  Lisinopril 20 MG Oral Tab, Take 1 Tab by mouth daily. , Disp: 90 Tab, Rfl: 0     Allergies Reviewed:    Pork Derived Produc*    NAUSEA AND VOMITING     History:  Revi Genitourinary: Negative for dysuria and difficulty urinating. Musculoskeletal: Negative for myalgias. Skin: Negative for color change and pallor. Allergic/Immunologic: Negative for immunocompromised state.    Neurological: Negative for syncope and wea · Current specimen with residual ductal carcinoma in situ (DCIS), multifocal, intermediate nuclear grade, cribriform, solid and micropapillary type with necrosis and microcalcifications.   · Largest focus of DCIS measures approximately 12 mm in maximum dime Procedure  Total mastectomy    Specimen Laterality  Right    TUMOR   Tumor Site  Upper outer quadrant      Lower outer quadrant      Central    Histologic Type  Ductal carcinoma in situ    Size (Extent) of DCIS  Estimated size of DCIS greatest dimension in Identified within centra slice 3 is a 0.4 x 0.3 x 0.3 cm hemorrhagic apparent biopsy cavity that extends to 1.3 cm from the superior margin, 1.8 cm from the inferior margin, 2.0 cm from the deep margin, 6.0 cm from the medial margin, and 11.0 cm form the l Specimen C is received in formalin labeled “Kasjenniferjeva, right breast sentinel lymph node #2” and consists of a 1.0 x 1.0 x 0.4 cm tan candidate lymph node with a minimal amount of attached yellow-tan fatty tissue.  The specimen is entirely submitted in roland

## 2019-07-31 NOTE — PROGRESS NOTES
Met with patient following appointment with Dr. Carissa Fischer. Pt is going to meet with Dr. Nohemy Mcdonough next week to discuss role of aromatase inhibitors. She does not need radiation. She will follow up with plastic surgery and surgical oncology for drain management.

## 2019-08-01 ENCOUNTER — NURSE ONLY (OUTPATIENT)
Dept: SURGERY | Facility: CLINIC | Age: 59
End: 2019-08-01
Payer: MEDICAID

## 2019-08-01 NOTE — PROGRESS NOTES
Post-op: S/p right tissue expander placement on 7/24/2019 with Dr. Sara Araujo. Pt is doing well. Incisions are clean, dry, and intact. Pt has some bruising. Pt reports no pain, fever, or chills. Drain #2 to be removed, due to low output. Pt tolerated well.

## 2019-08-08 ENCOUNTER — OFFICE VISIT (OUTPATIENT)
Dept: HEMATOLOGY/ONCOLOGY | Facility: HOSPITAL | Age: 59
End: 2019-08-08
Attending: GENETIC COUNSELOR, MS
Payer: MEDICAID

## 2019-08-08 VITALS
OXYGEN SATURATION: 98 % | BODY MASS INDEX: 27.82 KG/M2 | WEIGHT: 157 LBS | HEART RATE: 75 BPM | SYSTOLIC BLOOD PRESSURE: 155 MMHG | HEIGHT: 63.11 IN | DIASTOLIC BLOOD PRESSURE: 71 MMHG | TEMPERATURE: 98 F | RESPIRATION RATE: 18 BRPM

## 2019-08-08 DIAGNOSIS — D05.11 DUCTAL CARCINOMA IN SITU (DCIS) OF RIGHT BREAST: Primary | ICD-10-CM

## 2019-08-08 PROCEDURE — 99244 OFF/OP CNSLTJ NEW/EST MOD 40: CPT | Performed by: INTERNAL MEDICINE

## 2019-08-09 ENCOUNTER — OFFICE VISIT (OUTPATIENT)
Dept: SURGERY | Facility: CLINIC | Age: 59
End: 2019-08-09
Payer: MEDICAID

## 2019-08-09 DIAGNOSIS — Z90.11 ABSENCE OF BREAST, RIGHT: Primary | ICD-10-CM

## 2019-08-09 PROCEDURE — 99024 POSTOP FOLLOW-UP VISIT: CPT | Performed by: SURGERY

## 2019-08-09 NOTE — PROGRESS NOTES
Eulalia Morel is a 63year old female who presents today for a follow-up. She denies fever and chills. She denies nausea, vomiting, diarrhea or constipation. Her drain output has been minimal the past several days.       Physical Examination:  Breasts:

## 2019-08-16 ENCOUNTER — OFFICE VISIT (OUTPATIENT)
Dept: SURGERY | Facility: CLINIC | Age: 59
End: 2019-08-16
Payer: MEDICAID

## 2019-08-16 DIAGNOSIS — Z90.11 ABSENCE OF BREAST, RIGHT: Primary | ICD-10-CM

## 2019-08-16 PROCEDURE — 99024 POSTOP FOLLOW-UP VISIT: CPT | Performed by: SURGERY

## 2019-08-16 PROCEDURE — 88305 TISSUE EXAM BY PATHOLOGIST: CPT | Performed by: SURGERY

## 2019-08-16 RX ORDER — IBUPROFEN 200 MG
200 TABLET ORAL EVERY 6 HOURS PRN
COMMUNITY
End: 2019-08-23 | Stop reason: CLARIF

## 2019-08-16 NOTE — PROGRESS NOTES
Aydee Bishop is a 63year old female who presents today for a follow-up her daughter. She denies fever and chills. She denies nausea, vomiting, diarrhea or constipation.      Physical Examination:  Breasts: Right breast is noted to have marginal necros

## 2019-08-16 NOTE — PROCEDURES
Debridement of mastectomy skin flap necrosis. There is necrosis were encompassed in a horizontal ellipse. There is infiltrated with approximately 3 cc 1% lidocaine with epinephrine. The area was prepped and draped sterilely.   The ellipse was excised wit

## 2019-08-23 ENCOUNTER — OFFICE VISIT (OUTPATIENT)
Dept: SURGERY | Facility: CLINIC | Age: 59
End: 2019-08-23
Payer: MEDICAID

## 2019-08-23 DIAGNOSIS — Z90.11 ABSENCE OF BREAST, RIGHT: Primary | ICD-10-CM

## 2019-08-23 PROCEDURE — 99024 POSTOP FOLLOW-UP VISIT: CPT | Performed by: SURGERY

## 2019-08-23 NOTE — PROGRESS NOTES
Es Canada is a 63year old female who presents today for a follow-up. She denies fever and chills. She denies nausea, vomiting, diarrhea or constipation. Physical Examination:    Breasts: Right breast incision is clean dry and intact.   Proc

## 2019-08-30 ENCOUNTER — OFFICE VISIT (OUTPATIENT)
Dept: SURGERY | Facility: CLINIC | Age: 59
End: 2019-08-30
Payer: MEDICAID

## 2019-08-30 DIAGNOSIS — Z90.11 ABSENCE OF BREAST, RIGHT: Primary | ICD-10-CM

## 2019-08-30 PROCEDURE — 99024 POSTOP FOLLOW-UP VISIT: CPT | Performed by: SURGERY

## 2019-08-30 NOTE — PROGRESS NOTES
Jesse Jasso is a 63year old female who presents today for a follow-up. She denies fever and chills. She denies nausea, vomiting, diarrhea or constipation.         Physical Examination:  Breasts: Right breast incision is clean dry and intact.   Procedure: T

## 2019-09-06 ENCOUNTER — OFFICE VISIT (OUTPATIENT)
Dept: SURGERY | Facility: CLINIC | Age: 59
End: 2019-09-06
Payer: MEDICAID

## 2019-09-06 DIAGNOSIS — M25.511 RIGHT SHOULDER PAIN, UNSPECIFIED CHRONICITY: Primary | ICD-10-CM

## 2019-09-06 PROCEDURE — 99024 POSTOP FOLLOW-UP VISIT: CPT | Performed by: SURGERY

## 2019-09-06 NOTE — PROGRESS NOTES
Pito is a 63year old female who presents today for a follow-up. She complains of right shoulder discomfort with activity        Physical Examination:  Breasts: Right breast incision is clean dry and intact.   Limited abduction of the right shoulder

## 2019-09-06 NOTE — PROGRESS NOTES
PT ordered (right shoulder range of motion) per Dr. Christian Damon-  Patient left prior to receiving order, order to be mailed to the patient via standard mail.

## 2019-09-10 ENCOUNTER — TELEPHONE (OUTPATIENT)
Dept: SURGERY | Facility: CLINIC | Age: 59
End: 2019-09-10

## 2019-09-10 NOTE — TELEPHONE ENCOUNTER
Patient was called and M to cancel her appointment. Patient does not need to be seen for 5 months for follow up. Patient chantal need to be rescheduled.

## 2019-09-11 ENCOUNTER — OFFICE VISIT (OUTPATIENT)
Dept: SURGERY | Facility: CLINIC | Age: 59
End: 2019-09-11
Payer: MEDICAID

## 2019-09-11 VITALS
DIASTOLIC BLOOD PRESSURE: 84 MMHG | OXYGEN SATURATION: 98 % | HEART RATE: 72 BPM | RESPIRATION RATE: 16 BRPM | SYSTOLIC BLOOD PRESSURE: 153 MMHG | TEMPERATURE: 97 F

## 2019-09-11 DIAGNOSIS — D05.11 DUCTAL CARCINOMA IN SITU (DCIS) OF RIGHT BREAST: Primary | ICD-10-CM

## 2019-09-11 PROCEDURE — 99024 POSTOP FOLLOW-UP VISIT: CPT | Performed by: SURGERY

## 2019-09-11 NOTE — PROGRESS NOTES
[facility]    Patient Name:  Anthony Hall   YOB: 1960   Gender:  Female   Appt Date: 09/11/19   Provider: Crystal Young MD   Insurance: [facility] [address] 5/24/2019, the patient underwent right-sided diagnostic mammogram with tomosynthesis concurrent with right breast ultrasound. That returned to show a new 4.3 cm region of microcalcifications without an ultrasonographic correlate.   2 site stereotactic guid · All inked margins are free of carcinoma. · No definitive evidence of invasive carcinoma identified. · Preliminary pathologic staging pTis, N0(sn)     B. Right breast sentinel lymph node #1:   · Single lymph node, negative for carcinoma (0/1).   · Immuno Diagnosis Date   • Cancer [facility])     ductal carcinoma, right breast   • High blood pressure    • High cholesterol    • Prolapsed uterus       Reviewed:  Past Surgical History:   Procedure Laterality Date   • BREAST MASTECTOMY Right 7/24/2019    Performed by Psychiatric/Behavioral: Negative for confusion and agitation. Physical Examination:  Physical Exam    Constitutional: She is oriented to person, place, and time. She appears well-developed and well-nourished. HENT:   Head: Normocephalic.    Eyes: Pu · Background breast tissue with fibrocystic changes including ductal hyperplasia, intraductal papillomatosis, fibroadenomatosis, duct ectasia, adenosis and apocrine metaplasia. · Nipple, unremarkable. · All inked margins are free of carcinoma.   · No defi Architectural Patterns  Cribriform      Micropapillary      Solid    Nuclear Grade  Grade II (intermediate)    Accessory Findings     Necrosis  Present, focal (small foci or single cell necrosis)    Microcalcifications  Present in DCIS      Present in nonn Identified within centra slice 3 is a 0.4 x 0.3 x 0.3 cm hemorrhagic apparent biopsy cavity that extends to 1.3 cm from the superior margin, 1.8 cm from the inferior margin, 2.0 cm from the deep margin, 6.0 cm from the medial margin, and 11.0 cm form the l Specimen C is received in formalin labeled “Kasjenniferjeva, right breast sentinel lymph node #2” and consists of a 1.0 x 1.0 x 0.4 cm tan candidate lymph node with a minimal amount of attached yellow-tan fatty tissue.  The specimen is entirely submitted in roland

## 2019-09-13 ENCOUNTER — OFFICE VISIT (OUTPATIENT)
Dept: SURGERY | Facility: CLINIC | Age: 59
End: 2019-09-13
Payer: MEDICAID

## 2019-09-13 VITALS — HEIGHT: 63.11 IN | BODY MASS INDEX: 28.53 KG/M2 | WEIGHT: 161 LBS

## 2019-09-13 DIAGNOSIS — Z90.11 ABSENCE OF BREAST, RIGHT: Primary | ICD-10-CM

## 2019-09-13 PROCEDURE — 99024 POSTOP FOLLOW-UP VISIT: CPT | Performed by: SURGERY

## 2019-09-13 NOTE — PROGRESS NOTES
Ambar Miller is a 63year old female who presents today for a follow-up. She denies fever and chills. She denies nausea, vomiting, diarrhea or constipation.      Physical Examination:   09/13/19  1150   Weight: 73 kg (161 lb)   Height: 1.603 m (5' 3.11

## 2019-09-20 ENCOUNTER — OFFICE VISIT (OUTPATIENT)
Dept: SURGERY | Facility: CLINIC | Age: 59
End: 2019-09-20
Payer: MEDICAID

## 2019-09-20 DIAGNOSIS — Z90.11 ABSENCE OF BREAST, RIGHT: Primary | ICD-10-CM

## 2019-09-20 PROCEDURE — 99024 POSTOP FOLLOW-UP VISIT: CPT | Performed by: SURGERY

## 2019-10-18 ENCOUNTER — OFFICE VISIT (OUTPATIENT)
Dept: SURGERY | Facility: CLINIC | Age: 59
End: 2019-10-18
Payer: MEDICAID

## 2019-10-18 DIAGNOSIS — M25.611 STIFFNESS OF RIGHT SHOULDER JOINT: ICD-10-CM

## 2019-10-18 DIAGNOSIS — Z90.11 ABSENCE OF BREAST, RIGHT: Primary | ICD-10-CM

## 2019-10-18 PROCEDURE — 99024 POSTOP FOLLOW-UP VISIT: CPT | Performed by: SURGERY

## 2019-10-18 NOTE — PATIENT INSTRUCTIONS
Surgeon: Dr. Nico Pritchard      Tel:  [phone]    Fax: [phone]     Surgery/Procedure: Removal of right breast tissue expander, capsulotomy, and placement of permanent implant with autologous fat-grafting, left breast balancing mastopexy, 2.5 hours,

## 2019-10-18 NOTE — PROGRESS NOTES
Surgery and wash instructions verbally reviewed with the patient and written instructions were also provided. The patient understands the need to obtain medical clearance for this procedure and plans to see Dr. Lenore Dowd for this.      Informed consent for the pr

## 2019-10-18 NOTE — PROGRESS NOTES
Светлана Douglass is a 63year old female who presents today for a follow-up with her daughter. She is without new complaints. She relates that she underwent laparoscopic GYN procedure approximately 2 weeks ago. Ivonne Izquierdo Physical Examination:  Breasts:  The r

## 2019-11-12 ENCOUNTER — TELEPHONE (OUTPATIENT)
Dept: SURGERY | Facility: CLINIC | Age: 59
End: 2019-11-12

## 2019-11-18 NOTE — TELEPHONE ENCOUNTER
Patient called back but somehow got switched to another department. Please call patient to assist @ [phone].

## 2019-11-19 ENCOUNTER — TELEPHONE (OUTPATIENT)
Dept: SURGERY | Facility: CLINIC | Age: 59
End: 2019-11-19

## 2019-11-19 NOTE — TELEPHONE ENCOUNTER
Spoke with patient's son to schedule surgery. Offered dates in March. He stated, Darcie Sandhu so late, what about the medication Dr Galen Jack prescribed? She has to start that. \"     Received another phone call from patient stating, Percy Powell gave me cancer medica

## 2019-11-29 ENCOUNTER — TELEPHONE (OUTPATIENT)
Dept: SURGERY | Facility: CLINIC | Age: 59
End: 2019-11-29

## 2019-11-29 NOTE — TELEPHONE ENCOUNTER
Spoke with patient and her daughter to inform them that we had a cancellation on 12/19. Patient is agreeable to have surgery on this day. She would like to see if we can provide a date even earlier than 12/19.  Informed her that Dr Carrol Bamberger is booked until [facility]

## 2019-12-09 ENCOUNTER — MEDICAL CORRESPONDENCE (OUTPATIENT)
Dept: SURGERY | Facility: CLINIC | Age: 59
End: 2019-12-09

## 2019-12-09 NOTE — PROGRESS NOTES
Faxed EPIC request for medical clearance to Dr. Boni Sullivan office, fax confirmation page received. I will anticipate the clearance.

## 2019-12-10 ENCOUNTER — TELEPHONE (OUTPATIENT)
Dept: SURGERY | Facility: CLINIC | Age: 59
End: 2019-12-10

## 2019-12-10 NOTE — TELEPHONE ENCOUNTER
Called to reschedule appt from tomorrow 12/11/19 to mid January; [place] location; lm on  to cb. My direct # given. Call made using language line, #[phone] [facility] . Pt transferred from [facility] with , canceled tomorrow.   Res

## 2019-12-10 NOTE — TELEPHONE ENCOUNTER
I called and spoke with the patient using the language line, the patient speak both [place] and [place] (of note) - [facility] #[id number]  We discussed the need for mandatory medical clearance from Dr. Amilcar Garcia and that a communication was sent via fax to their of

## 2019-12-11 ENCOUNTER — TELEPHONE (OUTPATIENT)
Dept: SURGERY | Facility: CLINIC | Age: 59
End: 2019-12-11

## 2019-12-11 NOTE — TELEPHONE ENCOUNTER
I called Dr. Montez Del Rio office and spoke with Aakash Adams regarding my fax communication regarding mandatory medical clearance-  After a brief hold, she advised that Dr. Daphne Anne has not given this patient clearance yet and she may need a presurgical appointment.    They

## 2019-12-17 ENCOUNTER — TELEPHONE (OUTPATIENT)
Dept: SURGERY | Facility: CLINIC | Age: 59
End: 2019-12-17

## 2019-12-17 NOTE — TELEPHONE ENCOUNTER
Medical clearance received from Dr. Nils Carolina office and faxed to [facility] with a fax confirmation page received.

## 2019-12-17 NOTE — TELEPHONE ENCOUNTER
I called Dr. Octavia Rocha office and left an urgent message with the answering service regarding mandatory medical clearance which is needed for surgery with Dr. Meryl Fields on 12/19-  She will al my message as urgent and page the staff-   All details, including fax

## 2019-12-19 ENCOUNTER — ANESTHESIA (OUTPATIENT)
Dept: SURGERY | Facility: HOSPITAL | Age: 59
End: 2019-12-19
Payer: MEDICAID

## 2019-12-19 ENCOUNTER — HOSPITAL ENCOUNTER (OUTPATIENT)
Facility: HOSPITAL | Age: 59
Setting detail: HOSPITAL OUTPATIENT SURGERY
Discharge: HOME | End: 2019-12-19
Attending: SURGERY | Admitting: SURGERY
Payer: MEDICAID

## 2019-12-19 ENCOUNTER — ANESTHESIA EVENT (OUTPATIENT)
Dept: SURGERY | Facility: HOSPITAL | Age: 59
End: 2019-12-19
Payer: MEDICAID

## 2019-12-19 VITALS
HEIGHT: 63 IN | TEMPERATURE: 98 F | DIASTOLIC BLOOD PRESSURE: 69 MMHG | SYSTOLIC BLOOD PRESSURE: 139 MMHG | BODY MASS INDEX: 29.23 KG/M2 | RESPIRATION RATE: 18 BRPM | WEIGHT: 165 LBS | HEART RATE: 78 BPM | OXYGEN SATURATION: 95 %

## 2019-12-19 DIAGNOSIS — Z90.11 ABSENCE OF BREAST, RIGHT: ICD-10-CM

## 2019-12-19 PROCEDURE — 0HSU0ZZ REPOSITION LEFT BREAST, OPEN APPROACH: ICD-10-PCS | Performed by: SURGERY

## 2019-12-19 PROCEDURE — 88305 TISSUE EXAM BY PATHOLOGIST: CPT | Performed by: SURGERY

## 2019-12-19 PROCEDURE — 88300 SURGICAL PATH GROSS: CPT | Performed by: SURGERY

## 2019-12-19 PROCEDURE — 0HRT0JZ REPLACEMENT OF RIGHT BREAST WITH SYNTHETIC SUBSTITUTE, OPEN APPROACH: ICD-10-PCS | Performed by: SURGERY

## 2019-12-19 PROCEDURE — 0HRT37Z REPLACEMENT OF RIGHT BREAST WITH AUTOLOGOUS TISSUE SUBSTITUTE, PERCUTANEOUS APPROACH: ICD-10-PCS | Performed by: SURGERY

## 2019-12-19 PROCEDURE — 0HSXXZZ REPOSITION LEFT NIPPLE, EXTERNAL APPROACH: ICD-10-PCS | Performed by: SURGERY

## 2019-12-19 PROCEDURE — 0HPT0NZ REMOVAL OF TISSUE EXPANDER FROM RIGHT BREAST, OPEN APPROACH: ICD-10-PCS | Performed by: SURGERY

## 2019-12-19 PROCEDURE — 0JD83ZZ EXTRACTION OF ABDOMEN SUBCUTANEOUS TISSUE AND FASCIA, PERCUTANEOUS APPROACH: ICD-10-PCS | Performed by: SURGERY

## 2019-12-19 RX ORDER — NEOSTIGMINE METHYLSULFATE 0.5 MG/ML
INJECTION INTRAVENOUS AS NEEDED
Status: DISCONTINUED | OUTPATIENT
Start: 2019-12-19 | End: 2019-12-19 | Stop reason: SURG

## 2019-12-19 RX ORDER — ONDANSETRON 2 MG/ML
INJECTION INTRAMUSCULAR; INTRAVENOUS AS NEEDED
Status: DISCONTINUED | OUTPATIENT
Start: 2019-12-19 | End: 2019-12-19 | Stop reason: SURG

## 2019-12-19 RX ORDER — METOCLOPRAMIDE 10 MG/1
10 TABLET ORAL ONCE
Status: DISCONTINUED | OUTPATIENT
Start: 2019-12-19 | End: 2019-12-19 | Stop reason: HOSPADM

## 2019-12-19 RX ORDER — GLYCOPYRROLATE 0.2 MG/ML
INJECTION INTRAMUSCULAR; INTRAVENOUS AS NEEDED
Status: DISCONTINUED | OUTPATIENT
Start: 2019-12-19 | End: 2019-12-19 | Stop reason: SURG

## 2019-12-19 RX ORDER — HYDROCODONE BITARTRATE AND ACETAMINOPHEN 5; 325 MG/1; MG/1
1-2 TABLET ORAL EVERY 4 HOURS PRN
Qty: 40 TABLET | Refills: 0 | Status: SHIPPED | OUTPATIENT
Start: 2019-12-19 | End: 2020-01-10 | Stop reason: ALTCHOICE

## 2019-12-19 RX ORDER — LIDOCAINE HYDROCHLORIDE AND EPINEPHRINE 10; 10 MG/ML; UG/ML
INJECTION, SOLUTION INFILTRATION; PERINEURAL AS NEEDED
Status: DISCONTINUED | OUTPATIENT
Start: 2019-12-19 | End: 2019-12-19 | Stop reason: HOSPADM

## 2019-12-19 RX ORDER — FAMOTIDINE 20 MG/1
20 TABLET ORAL ONCE
Status: DISCONTINUED | OUTPATIENT
Start: 2019-12-19 | End: 2019-12-19 | Stop reason: HOSPADM

## 2019-12-19 RX ORDER — CEFAZOLIN SODIUM/WATER 2 G/20 ML
2 SYRINGE (ML) INTRAVENOUS ONCE
Status: COMPLETED | OUTPATIENT
Start: 2019-12-19 | End: 2019-12-19

## 2019-12-19 RX ORDER — ROCURONIUM BROMIDE 10 MG/ML
INJECTION, SOLUTION INTRAVENOUS AS NEEDED
Status: DISCONTINUED | OUTPATIENT
Start: 2019-12-19 | End: 2019-12-19 | Stop reason: SURG

## 2019-12-19 RX ORDER — SODIUM CHLORIDE, SODIUM LACTATE, POTASSIUM CHLORIDE, CALCIUM CHLORIDE 600; 310; 30; 20 MG/100ML; MG/100ML; MG/100ML; MG/100ML
INJECTION, SOLUTION INTRAVENOUS CONTINUOUS
Status: DISCONTINUED | OUTPATIENT
Start: 2019-12-19 | End: 2019-12-19

## 2019-12-19 RX ORDER — MIDAZOLAM HYDROCHLORIDE 1 MG/ML
INJECTION INTRAMUSCULAR; INTRAVENOUS AS NEEDED
Status: DISCONTINUED | OUTPATIENT
Start: 2019-12-19 | End: 2019-12-19 | Stop reason: SURG

## 2019-12-19 RX ORDER — LIDOCAINE HYDROCHLORIDE 10 MG/ML
INJECTION, SOLUTION EPIDURAL; INFILTRATION; INTRACAUDAL; PERINEURAL AS NEEDED
Status: DISCONTINUED | OUTPATIENT
Start: 2019-12-19 | End: 2019-12-19 | Stop reason: SURG

## 2019-12-19 RX ORDER — DOCUSATE SODIUM 100 MG/1
100 CAPSULE, LIQUID FILLED ORAL 2 TIMES DAILY
Qty: 40 CAPSULE | Refills: 0 | Status: SHIPPED | OUTPATIENT
Start: 2019-12-19 | End: 2020-01-10 | Stop reason: ALTCHOICE

## 2019-12-19 RX ORDER — ONDANSETRON 4 MG/1
4 TABLET, ORALLY DISINTEGRATING ORAL EVERY 8 HOURS PRN
Qty: 20 TABLET | Refills: 0 | Status: SHIPPED | OUTPATIENT
Start: 2019-12-19 | End: 2020-01-10 | Stop reason: ALTCHOICE

## 2019-12-19 RX ORDER — DEXAMETHASONE SODIUM PHOSPHATE 4 MG/ML
VIAL (ML) INJECTION AS NEEDED
Status: DISCONTINUED | OUTPATIENT
Start: 2019-12-19 | End: 2019-12-19 | Stop reason: SURG

## 2019-12-19 RX ORDER — CEPHALEXIN 500 MG/1
500 CAPSULE ORAL 4 TIMES DAILY
Qty: 28 CAPSULE | Refills: 0 | Status: SHIPPED | OUTPATIENT
Start: 2019-12-19 | End: 2019-12-26

## 2019-12-19 RX ORDER — ACETAMINOPHEN 500 MG
1000 TABLET ORAL ONCE
Status: COMPLETED | OUTPATIENT
Start: 2019-12-19 | End: 2019-12-19

## 2019-12-19 RX ADMIN — MIDAZOLAM HYDROCHLORIDE 2 MG: 1 INJECTION INTRAMUSCULAR; INTRAVENOUS at 13:42:00

## 2019-12-19 RX ADMIN — SODIUM CHLORIDE, SODIUM LACTATE, POTASSIUM CHLORIDE, CALCIUM CHLORIDE: 600; 310; 30; 20 INJECTION, SOLUTION INTRAVENOUS at 16:27:00

## 2019-12-19 RX ADMIN — GLYCOPYRROLATE 0.4 MG: 0.2 INJECTION INTRAMUSCULAR; INTRAVENOUS at 15:33:00

## 2019-12-19 RX ADMIN — CEFAZOLIN SODIUM/WATER 2 G: 2 G/20 ML SYRINGE (ML) INTRAVENOUS at 13:57:00

## 2019-12-19 RX ADMIN — ROCURONIUM BROMIDE 50 MG: 10 INJECTION, SOLUTION INTRAVENOUS at 13:44:00

## 2019-12-19 RX ADMIN — DEXAMETHASONE SODIUM PHOSPHATE 4 MG: 4 MG/ML VIAL (ML) INJECTION at 13:44:00

## 2019-12-19 RX ADMIN — LIDOCAINE HYDROCHLORIDE 50 MG: 10 INJECTION, SOLUTION EPIDURAL; INFILTRATION; INTRACAUDAL; PERINEURAL at 13:44:00

## 2019-12-19 RX ADMIN — NEOSTIGMINE METHYLSULFATE 5 MG: 0.5 INJECTION INTRAVENOUS at 15:33:00

## 2019-12-19 RX ADMIN — ONDANSETRON 4 MG: 2 INJECTION INTRAMUSCULAR; INTRAVENOUS at 13:44:00

## 2019-12-19 NOTE — ANESTHESIA PREPROCEDURE EVALUATION
Anesthesia PreOp Note    HPI:     Lynnette Peter is a 63year old female who presents for preoperative consultation requested by: Ortega Mares MD    Date of Surgery: 12/19/2019    Procedure(s):  BREAST RECONSTRUCTION SECOND STAGE/ REVISION  BREAST MAS 1232  famoTIDine (PEPCID) tab 20 mg, 20 mg, Oral, Once, Gayle Chan MD  Metoclopramide HCl (REGLAN) tab 10 mg, 10 mg, Oral, Once, Gayle Chan MD  ceFAZolin sodium (ANCEF/KEFZOL) 2 GM/20ML premix IV syringe 2 g, 2 g, Intravenous, Once, Gayle Chan, file      Available pre-op labs reviewed. Vital Signs: Body mass index is 29.23 kg/m². height is 1.6 m (5' 3\") and weight is 74.8 kg (165 lb). Her oral temperature is 98.3 °F (36.8 °C). Her blood pressure is 132/61 and her pulse is 77.  Her

## 2019-12-19 NOTE — BRIEF OP NOTE
Pre-Operative Diagnosis: Absence of breast, right [Z90.11]     Post-Operative Diagnosis: Absence of breast, right [Z90.11]      Procedure Performed:   Procedure(s):  Removal of right breast tissue expander, capsulotomy, and placement of permanent implant w

## 2019-12-19 NOTE — ANESTHESIA POSTPROCEDURE EVALUATION
Patient: Teddy Nair    Procedure Summary     Date:  12/19/19 Room / Location:  [address] [place] [address] OR  / [address] [place] [address] OR    Anesthesia Start:  9215 Anesthesia Stop:  1628    Procedures:       BREAST RECONSTRUCTION SECOND STAGE/ REVISION (N/A Breast)      BREAST

## 2019-12-19 NOTE — ANESTHESIA PROCEDURE NOTES
Airway    General Information and Staff    Anesthesiologist: Sharrie Councilman, MD  Resident/CRNA: Ori Garzon CRNA  Performed: CRNA     Indications and Patient Condition  Indications for airway management: anesthesia  Preoxygenated: yes  Mask difficult

## 2019-12-19 NOTE — H&P
Dr. Karen Pritchett clearance H&P from 12/17/19 reviewed. No interval changes. Informed consent obtained. She wishes to proceed as planned.

## 2019-12-20 NOTE — OPERATIVE REPORT
[facility]    PATIENT'S NAME: Sheryl Dean   ATTENDING PHYSICIAN: James Coulter MD   OPERATING PHYSICIAN: James Coulter MD   PATIENT ACCOUNT#:   [id number]    LOCATION:  [address] 10  MEDICAL RECORD #:   [id number]       DATE marked for liposuction. The patient was taken to the operating room, properly identified, placed in the supine position. Sequential compression devices were placed on bilateral lower extremities. Intravenous antibiotic prophylaxis was administered.   The Next, using a 4 mm cannula, power-assisted liposuction of bilateral flanks was performed. Approximately 200 mL of lipoaspirate was collected using the Spreetales system.   The liposuction port sites were closed with interrupted 3-0 Vicryl deep dermal sutures

## 2019-12-30 ENCOUNTER — OFFICE VISIT (OUTPATIENT)
Dept: SURGERY | Facility: CLINIC | Age: 59
End: 2019-12-30
Payer: MEDICAID

## 2019-12-30 DIAGNOSIS — Z90.11 ABSENCE OF BREAST, RIGHT: Primary | ICD-10-CM

## 2019-12-30 PROCEDURE — 99024 POSTOP FOLLOW-UP VISIT: CPT | Performed by: PHYSICIAN ASSISTANT

## 2019-12-30 NOTE — PROGRESS NOTES
This is a 60-year-old female that is 11 days status post her removal of right breast tissue expander with placement of permanent implant with left balancing mastopexy and autologous fat grafting of the right breast.  She has been compliant with the abdomin evaluation.

## 2020-01-10 ENCOUNTER — OFFICE VISIT (OUTPATIENT)
Dept: SURGERY | Facility: CLINIC | Age: 60
End: 2020-01-10
Payer: MEDICAID

## 2020-01-10 DIAGNOSIS — Z90.11 ABSENCE OF BREAST, RIGHT: Primary | ICD-10-CM

## 2020-01-10 PROCEDURE — 99024 POSTOP FOLLOW-UP VISIT: CPT | Performed by: SURGERY

## 2020-01-10 NOTE — PROGRESS NOTES
Kerry Huffman is a 63year old female who presents today for a follow-up with her daughter. She denies fever and chills. She denies nausea, vomiting, diarrhea or constipation.        Physical Examination:  Breasts: Bilateral breast incisions are well-

## 2020-01-15 ENCOUNTER — OFFICE VISIT (OUTPATIENT)
Dept: HEMATOLOGY/ONCOLOGY | Facility: HOSPITAL | Age: 60
End: 2020-01-15
Attending: GENETIC COUNSELOR, MS
Payer: MEDICAID

## 2020-01-15 ENCOUNTER — OFFICE VISIT (OUTPATIENT)
Dept: SURGERY | Facility: CLINIC | Age: 60
End: 2020-01-15
Payer: MEDICAID

## 2020-01-15 VITALS
HEART RATE: 69 BPM | HEIGHT: 63 IN | RESPIRATION RATE: 16 BRPM | SYSTOLIC BLOOD PRESSURE: 139 MMHG | DIASTOLIC BLOOD PRESSURE: 73 MMHG | WEIGHT: 166.81 LBS | OXYGEN SATURATION: 99 % | TEMPERATURE: 97 F | BODY MASS INDEX: 29.55 KG/M2

## 2020-01-15 VITALS
RESPIRATION RATE: 16 BRPM | DIASTOLIC BLOOD PRESSURE: 79 MMHG | BODY MASS INDEX: 30 KG/M2 | TEMPERATURE: 98 F | OXYGEN SATURATION: 98 % | WEIGHT: 167 LBS | HEART RATE: 75 BPM | SYSTOLIC BLOOD PRESSURE: 143 MMHG

## 2020-01-15 DIAGNOSIS — M77.8 CAPSULITIS OF RIGHT SHOULDER: ICD-10-CM

## 2020-01-15 DIAGNOSIS — D05.11 DUCTAL CARCINOMA IN SITU (DCIS) OF RIGHT BREAST: Primary | ICD-10-CM

## 2020-01-15 PROCEDURE — 99214 OFFICE O/P EST MOD 30 MIN: CPT | Performed by: INTERNAL MEDICINE

## 2020-01-15 PROCEDURE — 99214 OFFICE O/P EST MOD 30 MIN: CPT | Performed by: SURGERY

## 2020-01-15 RX ORDER — TAMOXIFEN CITRATE 20 MG/1
20 TABLET ORAL DAILY
Qty: 90 TABLET | Refills: 3 | Status: SHIPPED | OUTPATIENT
Start: 2020-01-15 | End: 2020-10-07

## 2020-01-15 NOTE — PROGRESS NOTES
Cancer Center Progress Note    Problem List:      Patient Active Problem List:     HTN (hypertension)     Hypercholesterolemia     Absence of breast, right     Breast cancer in situ     Ductal carcinoma in situ (DCIS) of right breast      Interim History: Right 7/24/2019    Performed by Eduard Hi MD at [facility] OR   • BREAST MASTOPEXY Left 12/19/2019    Performed by Alessandra Birmingham MD at [facility] OR   • BREAST RECONSTRUCTION SECOND STAGE/ REVISION N/A 12/19/2019    Performed by Alessandra Birmingham MD at [facility] no ulceration. Lymphatics: There is no palpable lymphadenopathy throughout in the cervical, supraclavicular, or axillary regions. Psychiatric: The patient's mood is calm and appropriate for this visit.       Labs reviewed at this visit:     Lab Results of thrombosis and hot flashes. She will proceed with tamoxifen 20 mg daily. I will see her in six months. She will continue with yearly mammogram for the left breast.    Right frozen shoulder/capsulitis:    I recommended PT evaluation and management.

## 2020-01-15 NOTE — PROGRESS NOTES
[facility]    Patient Name:  Lino Krause   YOB: 1960   Gender:  Female   Appt Date:  1/15/2020   Provider:  Batsheva Clemons MD   Insurance:  [address] [place] [address] 5/24/2019, the patient underwent right-sided diagnostic mammogram with tomosynthesis concurrent with right breast ultrasound. That returned to show a new 4.3 cm region of microcalcifications without an ultrasonographic correlate.   2 site stereotactic guid · Background breast tissue with fibrocystic changes including ductal hyperplasia, intraductal papillomatosis, fibroadenomatosis, duct ectasia, adenosis and apocrine metaplasia. · Nipple, unremarkable. · All inked margins are free of carcinoma.   · No defi •  Lisinopril 20 MG Oral Tab, Take 1 Tab by mouth daily. , Disp: 90 Tab, Rfl: 0  •  Tamoxifen Citrate 20 MG Oral Tab, Take 1 tablet (20 mg total) by mouth daily. , Disp: 90 tablet, Rfl: 3     Allergies Reviewed:    Pork Derived Produc*    NAUSEA AND VOMITING Respiratory: Negative for cough and shortness of breath. Cardiovascular: Negative for chest pain and palpitations. Gastrointestinal: Negative for nausea, vomiting, abdominal pain, diarrhea and abdominal distention.    Genitourinary: Negative for dysuri Follow up 6 months     MIQUEL Maldonado, PA-C    Department of Surgical Oncology  [facility] [place], [facility], [address] [facility]  [address] [place] [address],  [address]  T: [phone]  F: [phone]

## 2020-01-31 NOTE — PROGRESS NOTES
Yaniv Pool is a 63year old female who presents today for a follow-up. She denies fever and chills. She denies nausea, vomiting, diarrhea or constipation. Breasts: Right breast incision is clean dry and intact.       Procedure:  The right breast no

## 2020-02-03 ENCOUNTER — HOSPITAL ENCOUNTER (OUTPATIENT)
Dept: MAMMOGRAPHY | Facility: HOSPITAL | Age: 60
Discharge: HOME | End: 2020-02-03
Attending: SURGERY
Payer: MEDICAID

## 2020-02-03 DIAGNOSIS — D05.11 DUCTAL CARCINOMA IN SITU (DCIS) OF RIGHT BREAST: ICD-10-CM

## 2020-02-03 PROCEDURE — 77065 DX MAMMO INCL CAD UNI: CPT | Performed by: SURGERY

## 2020-02-03 PROCEDURE — 77061 BREAST TOMOSYNTHESIS UNI: CPT | Performed by: SURGERY

## 2020-02-05 ENCOUNTER — TELEPHONE (OUTPATIENT)
Dept: SURGERY | Facility: CLINIC | Age: 60
End: 2020-02-05

## 2020-02-05 DIAGNOSIS — D05.11 DUCTAL CARCINOMA IN SITU (DCIS) OF RIGHT BREAST: Primary | ICD-10-CM

## 2020-02-05 NOTE — TELEPHONE ENCOUNTER
Called patient with results of left diagnostic mammogram. Repeat Left diagnostic mammogram ordered in one year

## 2020-02-10 ENCOUNTER — APPOINTMENT (OUTPATIENT)
Dept: HEMATOLOGY/ONCOLOGY | Facility: HOSPITAL | Age: 60
End: 2020-02-10
Attending: GENETIC COUNSELOR, MS
Payer: MEDICAID

## 2020-03-10 ENCOUNTER — OFFICE VISIT (OUTPATIENT)
Dept: SURGERY | Facility: CLINIC | Age: 60
End: 2020-03-10
Payer: MEDICAID

## 2020-03-10 DIAGNOSIS — Z90.11 ABSENCE OF BREAST, RIGHT: Primary | ICD-10-CM

## 2020-03-10 PROCEDURE — 99024 POSTOP FOLLOW-UP VISIT: CPT | Performed by: SURGERY

## 2020-03-10 NOTE — PROGRESS NOTES
Homero Graf is a 63year old female who presents today for a follow-up with her daughter. She is without new complaints. Physical Examination:    Breasts: Bilateral breast incisions are well-healed with good shape and symmetry.   There is no eryt

## 2020-03-20 ENCOUNTER — TELEPHONE (OUTPATIENT)
Dept: SURGERY | Facility: CLINIC | Age: 60
End: 2020-03-20

## 2020-03-20 NOTE — TELEPHONE ENCOUNTER
LVM that we have forwarded her statement to patient accounts who will be submitting an appeal which should take at least 30-45 days to get a response. Left office phone number should she have any additional questions.

## 2020-06-16 ENCOUNTER — TELEPHONE (OUTPATIENT)
Dept: SURGERY | Facility: CLINIC | Age: 60
End: 2020-06-16

## 2020-06-16 NOTE — TELEPHONE ENCOUNTER
Left several messages on both home and cell to cancel 6- appt with Dr Sade Shukla and move to 6- 10am in [place]. My direct # given for call back to confirm.

## 2020-06-17 ENCOUNTER — APPOINTMENT (OUTPATIENT)
Dept: HEMATOLOGY/ONCOLOGY | Facility: HOSPITAL | Age: 60
End: 2020-06-17
Attending: GENETIC COUNSELOR, MS
Payer: MEDICAID

## 2020-06-19 ENCOUNTER — OFFICE VISIT (OUTPATIENT)
Dept: SURGERY | Facility: CLINIC | Age: 60
End: 2020-06-19
Payer: MEDICAID

## 2020-06-19 ENCOUNTER — OFFICE VISIT (OUTPATIENT)
Dept: HEMATOLOGY/ONCOLOGY | Facility: HOSPITAL | Age: 60
End: 2020-06-19
Attending: GENETIC COUNSELOR, MS
Payer: MEDICAID

## 2020-06-19 VITALS
WEIGHT: 164.38 LBS | SYSTOLIC BLOOD PRESSURE: 143 MMHG | TEMPERATURE: 99 F | BODY MASS INDEX: 29.12 KG/M2 | RESPIRATION RATE: 16 BRPM | HEART RATE: 79 BPM | DIASTOLIC BLOOD PRESSURE: 77 MMHG | HEIGHT: 62.99 IN | OXYGEN SATURATION: 98 %

## 2020-06-19 VITALS
HEIGHT: 63 IN | HEART RATE: 78 BPM | SYSTOLIC BLOOD PRESSURE: 129 MMHG | OXYGEN SATURATION: 96 % | WEIGHT: 163.19 LBS | RESPIRATION RATE: 16 BRPM | BODY MASS INDEX: 28.91 KG/M2 | DIASTOLIC BLOOD PRESSURE: 82 MMHG

## 2020-06-19 DIAGNOSIS — D05.11 DUCTAL CARCINOMA IN SITU (DCIS) OF RIGHT BREAST: Primary | ICD-10-CM

## 2020-06-19 DIAGNOSIS — D05.11 BREAST NEOPLASM, TIS (DCIS), RIGHT: Primary | ICD-10-CM

## 2020-06-19 PROCEDURE — 99213 OFFICE O/P EST LOW 20 MIN: CPT | Performed by: SURGERY

## 2020-06-19 PROCEDURE — 99213 OFFICE O/P EST LOW 20 MIN: CPT | Performed by: INTERNAL MEDICINE

## 2020-06-19 NOTE — PROGRESS NOTES
Cancer Center Progress Note    Problem List:      Patient Active Problem List:     HTN (hypertension)     Hypercholesterolemia     Absence of breast, right     Breast cancer in situ     Ductal carcinoma in situ (DCIS) of right breast      Interim History: N/A 12/19/2019    Performed by Rocio Abrams MD at [address] [place] [address] MAIN OR   • BREAST RECONSTRUCTION/ IMMEDIATE/EXPANDER Right 7/24/2019    Performed by Rocio Abrams MD at [address] [place] [address] OR   • Shana Lange Right 7/24/2019    Performed by Thania Harris ulceration. Lymphatics: There is no palpable lymphadenopathy throughout in the cervical, supraclavicular, or axillary regions. Psychiatric: The patient's mood is calm and appropriate for this visit.       Labs reviewed at this visit:     Lab Results   Com intervals. She was comfortable with this plan. She had breast exam by [facility] today. She has mammogram exam ordered.         Kiara Gleason MD

## 2020-06-19 NOTE — PROGRESS NOTES
[facility]    Patient Name:  Vanita Grimes   YOB: 1960   Gender:  Female   Appt Date:  06/19/20   Provider:  Neftali Carlisle MD   Insurance:  [address] [place] [address] ultrasound. That returned to show a new 4.3 cm region of microcalcifications without an ultrasonographic correlate. 2 site stereotactic guided biopsy was recommended. That took place on 6/3/2019.   The areas were described as: #1 11:00 anterior position ectasia, adenosis and apocrine metaplasia. · Nipple, unremarkable. · All inked margins are free of carcinoma. · No definitive evidence of invasive carcinoma identified. · Preliminary pathologic staging pTis, N0(sn)     B.  Right breast sentinel lymph no Take 1 tablet (20 mg total) by mouth daily. , Disp: 90 tablet, Rfl: 3  •  Pravastatin Sodium 20 MG Oral Tab, Take 1 Tab by mouth daily. , Disp: 90 Tab, Rfl: 0  •  Lisinopril 20 MG Oral Tab, Take 1 Tab by mouth daily. , Disp: 90 Tab, Rfl: 0     Allergies Revie fever and unexpected weight change. HENT: Negative for congestion, rhinorrhea and sore throat. Respiratory: Negative for cough and shortness of breath. Cardiovascular: Negative for chest pain and palpitations.    Gastrointestinal: Negative for nause breast were obtained. Standard 2D and additional multiplane thin sections of the breast were obtained for the purpose of Tomosynthesis evaluation. The images were reconstructed and reviewed on the dedicated Tomosynthesis workstation.      BREAST COMPOSI

## 2020-10-07 RX ORDER — TAMOXIFEN CITRATE 20 MG/1
20 TABLET ORAL DAILY
Qty: 90 TABLET | Refills: 0 | Status: SHIPPED | OUTPATIENT
Start: 2020-10-07 | End: 2021-04-08

## 2020-10-11 VITALS
SYSTOLIC BLOOD PRESSURE: 137 MMHG | HEIGHT: 64 IN | WEIGHT: 158 LBS | BODY MASS INDEX: 26.98 KG/M2 | DIASTOLIC BLOOD PRESSURE: 81 MMHG

## 2020-12-16 ENCOUNTER — OFFICE VISIT (OUTPATIENT)
Dept: HEMATOLOGY/ONCOLOGY | Facility: HOSPITAL | Age: 60
End: 2020-12-16
Attending: GENETIC COUNSELOR, MS
Payer: MEDICAID

## 2020-12-16 VITALS
OXYGEN SATURATION: 97 % | TEMPERATURE: 97 F | DIASTOLIC BLOOD PRESSURE: 69 MMHG | WEIGHT: 165.38 LBS | RESPIRATION RATE: 16 BRPM | SYSTOLIC BLOOD PRESSURE: 138 MMHG | BODY MASS INDEX: 29 KG/M2 | HEART RATE: 81 BPM

## 2020-12-16 DIAGNOSIS — D05.11 DUCTAL CARCINOMA IN SITU (DCIS) OF RIGHT BREAST: Primary | ICD-10-CM

## 2020-12-16 PROCEDURE — 99213 OFFICE O/P EST LOW 20 MIN: CPT | Performed by: INTERNAL MEDICINE

## 2020-12-16 NOTE — PROGRESS NOTES
Cancer Center Progress Note    Problem List:      Patient Active Problem List:     HTN (hypertension)     Hypercholesterolemia     Absence of breast, right     Breast cancer in situ     Ductal carcinoma in situ (DCIS) of right breast      Interim History: N/A 12/19/2019    Performed by Estrella Amaya MD at [facility] OR   • BREAST RECONSTRUCTION/ IMMEDIATE/EXPANDER Right 7/24/2019    Performed by Estrella Amaya MD at [facility] OR   • Shana Lange Right 7/24/2019    Performed by Wilfred Jama lymphadenopathy throughout in the cervical, supraclavicular, or axillary regions. Psychiatric: The patient's mood is calm and appropriate for this visit.       Labs reviewed at this visit:     Lab Results   Component Value Date    WBC 8.2 09/01/2012    MCV discussion with the language-line. Assessment/Plan:     Right breast DCIS  Mastectomy on 7/24/2019  Negative margins  Grade II  0/2 SLN's  %  CT 90%    The patient had mastectomy for right breast DCIS.  She is on tamoxifen 20 mg daily for breast c

## 2021-02-03 ENCOUNTER — HOSPITAL ENCOUNTER (OUTPATIENT)
Dept: MAMMOGRAPHY | Facility: HOSPITAL | Age: 61
Discharge: HOME | End: 2021-02-03
Attending: PHYSICIAN ASSISTANT
Payer: MEDICAID

## 2021-02-03 DIAGNOSIS — D05.11 DUCTAL CARCINOMA IN SITU (DCIS) OF RIGHT BREAST: ICD-10-CM

## 2021-02-03 PROCEDURE — 77065 DX MAMMO INCL CAD UNI: CPT | Performed by: PHYSICIAN ASSISTANT

## 2021-02-03 PROCEDURE — 77061 BREAST TOMOSYNTHESIS UNI: CPT | Performed by: PHYSICIAN ASSISTANT

## 2021-03-25 ENCOUNTER — TELEPHONE (OUTPATIENT)
Dept: SURGERY | Facility: CLINIC | Age: 61
End: 2021-03-25

## 2021-03-25 NOTE — TELEPHONE ENCOUNTER
Pt's son called, asking if pt was ok to receive the COVID vaccine. I told him that from a surgical standpoint she is ok to receive the vaccine. He appreciated the information.

## 2021-04-08 DIAGNOSIS — D05.11 DUCTAL CARCINOMA IN SITU (DCIS) OF RIGHT BREAST: Primary | ICD-10-CM

## 2021-04-08 DIAGNOSIS — D05.11 DUCTAL CARCINOMA IN SITU (DCIS) OF RIGHT BREAST: ICD-10-CM

## 2021-04-08 RX ORDER — TAMOXIFEN CITRATE 20 MG/1
TABLET ORAL
Qty: 90 TABLET | Refills: 0 | OUTPATIENT
Start: 2021-04-08

## 2021-04-08 RX ORDER — TAMOXIFEN CITRATE 20 MG/1
TABLET ORAL
Qty: 90 TABLET | Refills: 1 | Status: SHIPPED | OUTPATIENT
Start: 2021-04-08 | End: 2021-04-08

## 2021-04-08 RX ORDER — TAMOXIFEN CITRATE 20 MG/1
20 TABLET ORAL DAILY
Qty: 90 TABLET | Refills: 1 | Status: SHIPPED | OUTPATIENT
Start: 2021-04-08 | End: 2021-05-26

## 2021-04-08 NOTE — TELEPHONE ENCOUNTER
The pharmacy says there was a glitch in their system, and the patient's refill was deleted. She is requesting a new refill.

## 2021-04-13 ENCOUNTER — OFFICE VISIT (OUTPATIENT)
Dept: SURGERY | Facility: CLINIC | Age: 61
End: 2021-04-13
Payer: MEDICAID

## 2021-04-13 DIAGNOSIS — Z90.11 ABSENCE OF BREAST, RIGHT: Primary | ICD-10-CM

## 2021-04-13 PROCEDURE — 99212 OFFICE O/P EST SF 10 MIN: CPT | Performed by: SURGERY

## 2021-04-13 NOTE — PROGRESS NOTES
Homero Graf is a 62year old female who presents today for yearly follow-up with her daughter. She is a new complaints. Specifically, she denies any masses, skin changes, or left nipple discharge.  The patient underwent a left mammogram in February 20

## 2021-05-19 ENCOUNTER — OFFICE VISIT (OUTPATIENT)
Dept: SURGERY | Facility: CLINIC | Age: 61
End: 2021-05-19
Payer: MEDICAID

## 2021-05-19 VITALS
DIASTOLIC BLOOD PRESSURE: 80 MMHG | TEMPERATURE: 98 F | SYSTOLIC BLOOD PRESSURE: 136 MMHG | WEIGHT: 166.81 LBS | HEART RATE: 82 BPM | OXYGEN SATURATION: 99 % | RESPIRATION RATE: 16 BRPM | BODY MASS INDEX: 30 KG/M2

## 2021-05-19 DIAGNOSIS — D05.11 DUCTAL CARCINOMA IN SITU (DCIS) OF RIGHT BREAST: Primary | ICD-10-CM

## 2021-05-19 PROCEDURE — 3079F DIAST BP 80-89 MM HG: CPT | Performed by: SURGERY

## 2021-05-19 PROCEDURE — 3075F SYST BP GE 130 - 139MM HG: CPT | Performed by: SURGERY

## 2021-05-19 PROCEDURE — 99214 OFFICE O/P EST MOD 30 MIN: CPT | Performed by: SURGERY

## 2021-05-19 NOTE — PROGRESS NOTES
[facility]    Patient Name:  Rehan Lindsay   YOB: 1960   Gender:  Female   Appt Date:  05/19/2021   Provider:  Fuad Olsen MD   Insurance:  [address] [place] [address],[address] stereotactic guided biopsy was recommended. That took place on 6/3/2019. The areas were described as: #1 11:00 anterior position and; #2 10:00 subareolar position.   A Top-Hat clip was deployed at the 11 o'clock position and a hourglass shaped clip was de evidence of invasive carcinoma identified. · Preliminary pathologic staging pTis, N0(sn)     B. Right breast sentinel lymph node #1:   · Single lymph node, negative for carcinoma (0/1).   · Immunohistochemical stain for cytokeratin AE1/AE3 performed on blo 06/2019    DCIS   • Depression     Under tx/no meds   • High blood pressure    • High cholesterol    • Prolapsed uterus       Reviewed:  Past Surgical History:   Procedure Laterality Date   • HYSTERECTOMY     • MASTECTOMY RIGHT  06/2019   • ORAL SURGERY mass, no nipple discharge, no skin change and no tenderness. Abdominal: She exhibits no distension. Musculoskeletal: Normal range of motion. Lymphadenopathy:        Right axillary: No pectoral adenopathy present.         Left axillary: No pectoral adeno

## 2021-05-26 ENCOUNTER — OFFICE VISIT (OUTPATIENT)
Dept: HEMATOLOGY/ONCOLOGY | Facility: HOSPITAL | Age: 61
End: 2021-05-26
Attending: GENETIC COUNSELOR, MS
Payer: MEDICAID

## 2021-05-26 VITALS
WEIGHT: 167 LBS | SYSTOLIC BLOOD PRESSURE: 142 MMHG | TEMPERATURE: 98 F | OXYGEN SATURATION: 98 % | DIASTOLIC BLOOD PRESSURE: 82 MMHG | HEIGHT: 62.99 IN | BODY MASS INDEX: 29.59 KG/M2 | RESPIRATION RATE: 16 BRPM | HEART RATE: 68 BPM

## 2021-05-26 DIAGNOSIS — D05.11 DUCTAL CARCINOMA IN SITU (DCIS) OF RIGHT BREAST: Primary | ICD-10-CM

## 2021-05-26 PROCEDURE — 99213 OFFICE O/P EST LOW 20 MIN: CPT | Performed by: INTERNAL MEDICINE

## 2021-05-26 RX ORDER — TAMOXIFEN CITRATE 20 MG/1
20 TABLET ORAL DAILY
Qty: 90 TABLET | Refills: 3 | Status: SHIPPED | OUTPATIENT
Start: 2021-05-26

## 2021-05-26 NOTE — PROGRESS NOTES
Cancer Center Progress Note    Problem List:      Patient Active Problem List:     HTN (hypertension)     Hypercholesterolemia     Absence of breast, right     Breast cancer in situ     Ductal carcinoma in situ (DCIS) of right breast      Interim History: 60   • Breast Cancer Brother 61        dx age 62   • Breast Cancer Self 63       Allergies:       Pork Derived Produc*    NAUSEA AND VOMITING    Medications:  Tamoxifen Citrate 20 MG Oral Tab, Take 1 tablet (20 mg total) by mouth daily. , Disp: 90 tablet, R 07/24/2019    BUN 15 07/24/2019    CREATSERUM 0.82 07/24/2019     (H) 07/24/2019    CA 8.8 07/24/2019    ALKPHO 94 09/01/2012    ALT 39 09/01/2012    AST 26 09/01/2012    ALB 4.5 09/01/2012    TP 7.6 09/01/2012       Radiologic imaging reviewed at t [facility] MD Linette

## 2021-11-29 ENCOUNTER — APPOINTMENT (OUTPATIENT)
Dept: HEMATOLOGY/ONCOLOGY | Facility: HOSPITAL | Age: 61
End: 2021-11-29
Attending: GENETIC COUNSELOR, MS
Payer: MEDICAID

## 2021-12-15 ENCOUNTER — OFFICE VISIT (OUTPATIENT)
Dept: HEMATOLOGY/ONCOLOGY | Facility: HOSPITAL | Age: 61
End: 2021-12-15
Attending: INTERNAL MEDICINE
Payer: MEDICAID

## 2021-12-15 VITALS
SYSTOLIC BLOOD PRESSURE: 137 MMHG | BODY MASS INDEX: 29.59 KG/M2 | TEMPERATURE: 98 F | HEART RATE: 81 BPM | DIASTOLIC BLOOD PRESSURE: 81 MMHG | OXYGEN SATURATION: 100 % | RESPIRATION RATE: 16 BRPM | HEIGHT: 62.99 IN | WEIGHT: 167 LBS

## 2021-12-15 DIAGNOSIS — Z12.39 BREAST SCREENING: ICD-10-CM

## 2021-12-15 DIAGNOSIS — D05.11 DUCTAL CARCINOMA IN SITU (DCIS) OF RIGHT BREAST: Primary | ICD-10-CM

## 2021-12-15 PROCEDURE — 99213 OFFICE O/P EST LOW 20 MIN: CPT | Performed by: INTERNAL MEDICINE

## 2021-12-15 RX ORDER — DONEPEZIL HYDROCHLORIDE 5 MG/1
TABLET, FILM COATED ORAL
COMMUNITY
Start: 2021-10-14

## 2021-12-15 RX ORDER — ESCITALOPRAM OXALATE 10 MG/1
TABLET ORAL
COMMUNITY
Start: 2021-10-14

## 2021-12-15 RX ORDER — ZOLPIDEM TARTRATE 5 MG/1
TABLET ORAL
COMMUNITY
Start: 2021-10-14

## 2021-12-15 NOTE — PROGRESS NOTES
Patient is here for follow up for breast cancer. She is taking tamoxifen. She does experience hot flashes. She has some knee pain but this is long standing and she is treated with steroid injections. Her energy is fairly good and she is eating well.

## 2021-12-15 NOTE — PROGRESS NOTES
Cancer Center Progress Note    Problem List:      Patient Active Problem List:     HTN (hypertension)     Hypercholesterolemia     Absence of breast, right     Breast cancer in situ     Ductal carcinoma in situ (DCIS) of right breast      Interim History: Mother 59   • Breast Cancer Brother 61        dx age 62   • Breast Cancer Self 63       Allergies:       Pork Derived Produc*    NAUSEA AND VOMITING    Medications:  donepezil 5 MG Oral Tab, , Disp: , Rfl:   escitalopram 10 MG Oral Tab, , Disp: , Rfl:   zo CREATSERUM 0.82 07/24/2019     (H) 07/24/2019    CA 8.8 07/24/2019    ALKPHO 94 09/01/2012    ALT 39 09/01/2012    AST 26 09/01/2012    ALB 4.5 09/01/2012    TP 7.6 09/01/2012       Radiologic imaging reviewed at this visit:    Mammogram on 2/3/2021

## 2021-12-29 ENCOUNTER — TELEPHONE (OUTPATIENT)
Dept: HEMATOLOGY/ONCOLOGY | Facility: HOSPITAL | Age: 61
End: 2021-12-29

## 2021-12-29 NOTE — TELEPHONE ENCOUNTER
Patient's daughter called to inform patient is in [facility] and would like to discuss treatment

## 2021-12-29 NOTE — TELEPHONE ENCOUNTER
I spoke with the patient's daughter. She reports that the patient has been admitted to [facility] with bilateral lower extremity DVT. Her tamoxifen has been stopped and she is receiving blood thinner subcutaneous injections. The patient  is supposed to have hysterectomy and will likely continue on the injections until the surgery. It is unclear at present when the surgery will be according to the daughter. I explained that at some point after discharge and surgery she will need to return to see Dr. Jess Carvajal about medication for her cancer. At present she needs to be treated for the DVT. Dr. Jess Carvajal notified.

## 2022-03-18 ENCOUNTER — HOSPITAL ENCOUNTER (OUTPATIENT)
Dept: MAMMOGRAPHY | Facility: HOSPITAL | Age: 62
Discharge: HOME | End: 2022-03-18
Attending: INTERNAL MEDICINE
Payer: MEDICAID

## 2022-03-18 DIAGNOSIS — D05.11 DUCTAL CARCINOMA IN SITU (DCIS) OF RIGHT BREAST: ICD-10-CM

## 2022-03-18 DIAGNOSIS — Z12.39 BREAST SCREENING: ICD-10-CM

## 2022-03-18 PROCEDURE — 77063 BREAST TOMOSYNTHESIS BI: CPT | Performed by: INTERNAL MEDICINE

## 2022-03-18 PROCEDURE — 77067 SCR MAMMO BI INCL CAD: CPT | Performed by: INTERNAL MEDICINE

## 2022-05-10 ENCOUNTER — TELEPHONE (OUTPATIENT)
Dept: SURGERY | Facility: CLINIC | Age: 62
End: 2022-05-10

## 2022-05-10 NOTE — TELEPHONE ENCOUNTER
Called pt and talked to her son regarding tomorrow's  appt, it was changed to 12:15 pm at [place]. Son understood and was ok with time change.

## 2022-05-11 ENCOUNTER — TELEPHONE (OUTPATIENT)
Dept: HEMATOLOGY/ONCOLOGY | Facility: HOSPITAL | Age: 62
End: 2022-05-11

## 2022-05-11 ENCOUNTER — OFFICE VISIT (OUTPATIENT)
Dept: SURGERY | Facility: CLINIC | Age: 62
End: 2022-05-11
Payer: MEDICAID

## 2022-05-11 ENCOUNTER — APPOINTMENT (OUTPATIENT)
Dept: HEMATOLOGY/ONCOLOGY | Facility: HOSPITAL | Age: 62
End: 2022-05-11
Attending: INTERNAL MEDICINE
Payer: MEDICAID

## 2022-05-11 VITALS
BODY MASS INDEX: 29 KG/M2 | HEART RATE: 75 BPM | TEMPERATURE: 99 F | SYSTOLIC BLOOD PRESSURE: 119 MMHG | OXYGEN SATURATION: 99 % | DIASTOLIC BLOOD PRESSURE: 74 MMHG | WEIGHT: 165.19 LBS

## 2022-05-11 VITALS
HEART RATE: 77 BPM | TEMPERATURE: 99 F | BODY MASS INDEX: 29 KG/M2 | WEIGHT: 165.19 LBS | OXYGEN SATURATION: 100 % | DIASTOLIC BLOOD PRESSURE: 83 MMHG | RESPIRATION RATE: 18 BRPM | SYSTOLIC BLOOD PRESSURE: 153 MMHG

## 2022-05-11 DIAGNOSIS — D05.11 DUCTAL CARCINOMA IN SITU (DCIS) OF RIGHT BREAST: Primary | ICD-10-CM

## 2022-05-11 DIAGNOSIS — I82.492 ACUTE DEEP VEIN THROMBOSIS (DVT) OF OTHER SPECIFIED VEIN OF LEFT LOWER EXTREMITY (HCC): ICD-10-CM

## 2022-05-11 PROCEDURE — 99214 OFFICE O/P EST MOD 30 MIN: CPT | Performed by: SURGERY

## 2022-05-11 PROCEDURE — 3078F DIAST BP <80 MM HG: CPT | Performed by: SURGERY

## 2022-05-11 PROCEDURE — 99214 OFFICE O/P EST MOD 30 MIN: CPT | Performed by: INTERNAL MEDICINE

## 2022-05-11 PROCEDURE — 3074F SYST BP LT 130 MM HG: CPT | Performed by: SURGERY

## 2022-05-16 ENCOUNTER — APPOINTMENT (OUTPATIENT)
Dept: HEMATOLOGY/ONCOLOGY | Facility: HOSPITAL | Age: 62
End: 2022-05-16
Attending: INTERNAL MEDICINE
Payer: MEDICAID

## 2022-06-28 ENCOUNTER — OFFICE VISIT (OUTPATIENT)
Dept: SURGERY | Facility: CLINIC | Age: 62
End: 2022-06-28
Payer: MEDICAID

## 2022-06-28 DIAGNOSIS — Z90.11 ABSENCE OF BREAST, RIGHT: Primary | ICD-10-CM

## 2022-06-28 PROCEDURE — 99212 OFFICE O/P EST SF 10 MIN: CPT | Performed by: SURGERY

## 2022-06-28 NOTE — PROGRESS NOTES
Avery Thompson is a 65year old female who presents today for a yearly follow-up with her daughter. She she is without new complaints. Specifically, she denies any masses or skin changes. She has a history of a right mastectomy and reconstruction with a Natrelle Inspira soft touch breast implant, style SSM, 485 cc in December 2019. Her last mammogram was in March and showed benign findings. Physical Examination:  HEENT: There is no cervical or supraclavicular lymphadenopathy noted. Breasts: Bilateral breasts are soft without palpable masses. There is no left nipple inversion or nipple discharge noted. Well-healed Saul pattern scars are noted on the left breast.  There is no axillary lymphadenopathy noted bilaterally. There is no erythema or seroma noted. Assessment and Plan:  Patient is doing well. We reviewed the recommended FDA surveillance protocol for silicone implants. We discussed continuing regular self breast examination with a plan for follow-up in 1 year or sooner if any changes are detected. The plan was reviewed with the patient and daughter and questions were answered.

## 2022-11-09 ENCOUNTER — OFFICE VISIT (OUTPATIENT)
Dept: HEMATOLOGY/ONCOLOGY | Facility: HOSPITAL | Age: 62
End: 2022-11-09
Attending: INTERNAL MEDICINE
Payer: MEDICAID

## 2022-11-09 VITALS
DIASTOLIC BLOOD PRESSURE: 79 MMHG | RESPIRATION RATE: 18 BRPM | TEMPERATURE: 98 F | SYSTOLIC BLOOD PRESSURE: 138 MMHG | OXYGEN SATURATION: 97 % | WEIGHT: 164.81 LBS | HEART RATE: 84 BPM | BODY MASS INDEX: 29 KG/M2

## 2022-11-09 DIAGNOSIS — D05.11 DUCTAL CARCINOMA IN SITU (DCIS) OF RIGHT BREAST: Primary | ICD-10-CM

## 2022-11-09 DIAGNOSIS — Z78.0 ASYMPTOMATIC MENOPAUSE: ICD-10-CM

## 2022-11-09 DIAGNOSIS — Z12.31 BREAST CANCER SCREENING BY MAMMOGRAM: ICD-10-CM

## 2022-11-09 DIAGNOSIS — I82.492 ACUTE DEEP VEIN THROMBOSIS (DVT) OF OTHER SPECIFIED VEIN OF LEFT LOWER EXTREMITY (HCC): ICD-10-CM

## 2022-11-09 PROCEDURE — 99214 OFFICE O/P EST MOD 30 MIN: CPT | Performed by: INTERNAL MEDICINE

## 2022-11-09 RX ORDER — RIVAROXABAN 20 MG/1
TABLET, FILM COATED ORAL
COMMUNITY
Start: 2022-08-20

## 2022-11-09 RX ORDER — LETROZOLE 2.5 MG/1
TABLET, FILM COATED ORAL
COMMUNITY
Start: 2022-08-18

## 2022-11-09 NOTE — PROGRESS NOTES
Patient is here for follow up for DCIS on letrozole. She had a DVT while on tamoxifen and was placed on xarelto. She denies any side effects from either letrozole or xarelto. She denies any bleeding or leg swelling.      Education Record    Learner:  Patient and Friend    Disease / Diagnosis: DCIS    Barriers / Limitations:  Language   Comments:    Method:  Brief focused   Comments:    General Topics:  Side effects and symptom management   Comments:    Outcome:  Shows understanding   Comments:

## 2023-05-09 ENCOUNTER — HOSPITAL ENCOUNTER (OUTPATIENT)
Dept: MAMMOGRAPHY | Age: 63
Discharge: HOME | End: 2023-05-09
Attending: INTERNAL MEDICINE
Payer: MEDICAID

## 2023-05-09 ENCOUNTER — HOSPITAL ENCOUNTER (OUTPATIENT)
Dept: BONE DENSITY | Age: 63
Discharge: HOME | End: 2023-05-09
Attending: INTERNAL MEDICINE
Payer: MEDICAID

## 2023-05-09 DIAGNOSIS — I82.492 ACUTE DEEP VEIN THROMBOSIS (DVT) OF OTHER SPECIFIED VEIN OF LEFT LOWER EXTREMITY (HCC): ICD-10-CM

## 2023-05-09 DIAGNOSIS — D05.11 DUCTAL CARCINOMA IN SITU (DCIS) OF RIGHT BREAST: ICD-10-CM

## 2023-05-09 DIAGNOSIS — Z78.0 ASYMPTOMATIC MENOPAUSE: ICD-10-CM

## 2023-05-09 DIAGNOSIS — Z12.31 BREAST CANCER SCREENING BY MAMMOGRAM: ICD-10-CM

## 2023-05-09 PROCEDURE — 77063 BREAST TOMOSYNTHESIS BI: CPT | Performed by: INTERNAL MEDICINE

## 2023-05-09 PROCEDURE — 77080 DXA BONE DENSITY AXIAL: CPT | Performed by: INTERNAL MEDICINE

## 2023-05-09 PROCEDURE — 77067 SCR MAMMO BI INCL CAD: CPT | Performed by: INTERNAL MEDICINE

## 2023-05-10 ENCOUNTER — OFFICE VISIT (OUTPATIENT)
Dept: HEMATOLOGY/ONCOLOGY | Facility: HOSPITAL | Age: 63
End: 2023-05-10
Attending: INTERNAL MEDICINE
Payer: MEDICAID

## 2023-05-10 VITALS
WEIGHT: 164 LBS | DIASTOLIC BLOOD PRESSURE: 75 MMHG | HEART RATE: 74 BPM | TEMPERATURE: 99 F | BODY MASS INDEX: 29 KG/M2 | SYSTOLIC BLOOD PRESSURE: 130 MMHG | OXYGEN SATURATION: 99 %

## 2023-05-10 DIAGNOSIS — I82.492 ACUTE DEEP VEIN THROMBOSIS (DVT) OF OTHER SPECIFIED VEIN OF LEFT LOWER EXTREMITY (HCC): ICD-10-CM

## 2023-05-10 DIAGNOSIS — D05.11 DUCTAL CARCINOMA IN SITU (DCIS) OF RIGHT BREAST: Primary | ICD-10-CM

## 2023-05-10 PROCEDURE — 99213 OFFICE O/P EST LOW 20 MIN: CPT | Performed by: INTERNAL MEDICINE

## 2023-05-10 RX ORDER — MELOXICAM 15 MG/1
15 TABLET ORAL
COMMUNITY
Start: 2023-03-04

## 2023-05-10 RX ORDER — AMLODIPINE BESYLATE 5 MG/1
TABLET ORAL
COMMUNITY
Start: 2023-05-10

## 2023-05-10 NOTE — PROGRESS NOTES
Patient is here for follow up for breast cancer on letrozole. She does have hot flashes but less then with tamoxifen. She also has a morning cough. This is less frequent throughout the rest of the day. Patient says that she has variable energy but is able to do the things that she wants to. She denies any fever, cough or shortness of breath. She denies any bowel changes. She had mammogram and dexa scan yesterday and results are available.      Education Record    Learner:  Patient and Friend    Disease / Diagnosis: Breast cancer    Barriers / Limitations:  None   Comments:    Method:  Discussion   Comments:    General Topics:  Side effects and symptom management   Comments:    Outcome:  Shows understanding   Comments:

## 2023-07-07 ENCOUNTER — OFFICE VISIT (OUTPATIENT)
Dept: SURGERY | Facility: CLINIC | Age: 63
End: 2023-07-07
Payer: MEDICAID

## 2023-07-07 DIAGNOSIS — Z90.11 ABSENCE OF BREAST, RIGHT: Primary | ICD-10-CM

## 2023-07-07 PROCEDURE — 99212 OFFICE O/P EST SF 10 MIN: CPT | Performed by: SURGERY

## 2023-07-07 NOTE — PROGRESS NOTES
Rachana Saldana is a 59year old female who presents today for a yearly follow-up with her daughter. She she is without new complaints. Specifically, she denies any masses or skin changes. She has a history of a right mastectomy and reconstruction with a Natrelle Inspira soft touch breast implant, style SSM, 485 cc in December 2019. She underwent a contralateral balancing procedure. Her last mammogram was in May and showed benign findings. Physical Examination:  HEENT: There is no cervical or supraclavicular lymphadenopathy noted. Breasts: Bilateral breasts are soft without palpable masses. There is no left nipple inversion or nipple discharge noted. Well-healed Saul pattern scars are noted on the left breast.  There is no axillary lymphadenopathy noted bilaterally. There is no erythema or seroma noted. Assessment and Plan:  Patient is doing well. We reviewed the recommended FDA surveillance protocol for silicone implants. We discussed continuing regular self breast examination with a plan for follow-up in 1 year or sooner if any changes are detected. The plan was reviewed with the patient and daughter and questions were answered.

## 2023-10-17 ENCOUNTER — TELEPHONE (OUTPATIENT)
Dept: HEMATOLOGY/ONCOLOGY | Facility: HOSPITAL | Age: 63
End: 2023-10-17

## 2023-10-31 ENCOUNTER — TELEPHONE (OUTPATIENT)
Dept: HEMATOLOGY/ONCOLOGY | Facility: HOSPITAL | Age: 63
End: 2023-10-31

## 2023-11-08 ENCOUNTER — APPOINTMENT (OUTPATIENT)
Dept: HEMATOLOGY/ONCOLOGY | Facility: HOSPITAL | Age: 63
End: 2023-11-08
Attending: INTERNAL MEDICINE
Payer: MEDICAID

## 2023-12-20 ENCOUNTER — OFFICE VISIT (OUTPATIENT)
Dept: HEMATOLOGY/ONCOLOGY | Facility: HOSPITAL | Age: 63
End: 2023-12-20
Attending: INTERNAL MEDICINE
Payer: MEDICAID

## 2023-12-20 VITALS
SYSTOLIC BLOOD PRESSURE: 158 MMHG | HEART RATE: 90 BPM | WEIGHT: 164.63 LBS | BODY MASS INDEX: 29 KG/M2 | OXYGEN SATURATION: 98 % | DIASTOLIC BLOOD PRESSURE: 83 MMHG | RESPIRATION RATE: 18 BRPM

## 2023-12-20 DIAGNOSIS — I82.492 ACUTE DEEP VEIN THROMBOSIS (DVT) OF OTHER SPECIFIED VEIN OF LEFT LOWER EXTREMITY (HCC): ICD-10-CM

## 2023-12-20 DIAGNOSIS — D05.11 DUCTAL CARCINOMA IN SITU (DCIS) OF RIGHT BREAST: Primary | ICD-10-CM

## 2023-12-20 PROCEDURE — 99213 OFFICE O/P EST LOW 20 MIN: CPT | Performed by: INTERNAL MEDICINE

## 2023-12-20 NOTE — PROGRESS NOTES
Patient is here for follow up for breast cancer on letrozole. She denies any fever or shortness of breath. She has occasional joint pains. She has an intermittent morning cough, like clearing her throat. Her appetite and energy are good.      Education Record    Learner:  Patient and Family Member    Disease / Diagnosis: Breast cancer    Barriers / Limitations:  language   Comments: family member translates at pt request    Method:  Discussion   Comments:    General Topics:  Side effects and symptom management   Comments:    Outcome:  Shows understanding   Comments:

## 2023-12-20 NOTE — PROGRESS NOTES
Cancer Center Progress Note    Problem List:      Patient Active Problem List   Diagnosis    HTN (hypertension)    Hypercholesterolemia    Absence of breast, right    Breast cancer in situ    Ductal carcinoma in situ (DCIS) of right breast       Interim History:    Claire Henry presents today for evaluation and management of a diagnosis of DCIS of the right breast.    The patient has been doing well. She is followed for DCIS. She has been on letrozole. She had a h/o DVT while on tamoxifen. She had CRISTHIAN at [facility] in 12/2021. She was diagnosed with left leg DVT about 3 days after surgery. She was treated with rivaroxaban 20 mg daily. She has no chest pain or dyspnea. She has no cough. She has no weight loss. She is tolerating the letrozole. She has no hot flashes. She has no other pain. She has no other complaints. Review of Systems:   Constitutional: Negative for anorexia, fatigue, fevers, chills, night sweats and weight loss. Eyes: Negative for visual disturbance, irritation and redness. Respiratory: Negative for cough, hemoptysis, chest pain, or dyspnea. Cardiovascular: Negative for angina, orthopnea or palpitations. Gastrointestinal: Negative for nausea, vomiting, change in bowel habits, diarrhea, constipation and abdominal pain. Integument/breast: Negative for rash, skin lesions, and pruritus. Hematologic/lymphatic: Negative for easy bruising, bleeding, and lymphadenopathy. Genitourinary: Negative for dysuria or hematuria  Neurological: Negative for headaches, dizziness, speech problems, gait problems and focal weakness. Psychiatric: The patient's mood was calm and appropriate for this visit. The pertinent positives and negatives were described. All other systems were negative.     PMH/PSH:  Past Medical History:   Diagnosis Date    Anesthesia complication     trouble breathing     Back problem     Cancer ([facility] [address] 06/2019    DCIS    Depression     Under tx/no meds    High blood pressure     High cholesterol     Prolapsed uterus        Past Surgical History:   Procedure Laterality Date    HYSTERECTOMY      MASTECTOMY RIGHT  06/2019    ORAL SURGERY      REDUCTION LEFT  06/2019    Left reduction       Family History Reviewed:  Family History   Problem Relation Age of Onset    Other (lung cancer) Mother 59    Breast Cancer Brother 61        dx age 61    Breast Cancer Self 63       Allergies: Allergies   Allergen Reactions    Pork Derived Products NAUSEA AND VOMITING       Medications:   amLODIPine 5 MG Oral Tab       Meloxicam 15 MG Oral Tab Take 1 tablet (15 mg total) by mouth daily with food. letrozole 2.5 MG Oral Tab       XARELTO 20 MG Oral Tab       donepezil 5 MG Oral Tab       escitalopram 10 MG Oral Tab       zolpidem 5 MG Oral Tab       Pravastatin Sodium 20 MG Oral Tab Take 1 Tab by mouth daily. 90 Tab 0       Vital Signs:      Height: --  Weight: 74.7 kg (164 lb 9.6 oz) (12/20 1446)  BSA (Calculated - sq m): --  Pulse: 90 (12/20 1446)  BP: 158/83 (12/20 1446)  Temp: --  Do Not Use - Resp Rate: --  SpO2: 98 % (12/20 1446)      Performance Status:  ECOG 0: Fully active, able to carry on all pre-disease performance without restriction     Physical Examination:    Constitutional: Patient is alert and  not in acute distress. Respiratory: Clear to auscultation and percussion. No rales. No wheezes. Cardiovascular: Regular rate and rhythm. No murmurs. Gastrointestinal: Soft, non tender with good bowel sounds. Musculoskeletal: No edema. No calf tenderness. There is decreased ROM at the right shoulder. Skin: No suspicious skin lesion, no rash, no ulceration. Lymphatics: There is no palpable lymphadenopathy throughout in the cervical, supraclavicular, or axillary regions. Psychiatric: The patient's mood is calm and appropriate for this visit.       Labs reviewed at this visit:     Lab Results   Component Value Date    WBC 8.2 09/01/2012    MCV 82 09/01/2012    MCH 28.0 09/01/2012    MCHC 34.1 09/01/2012    RDW 13.8 09/01/2012     09/01/2012     Lab Results   Component Value Date     07/24/2019    K 4.0 07/24/2019     07/24/2019    CO2 22.0 07/24/2019    BUN 15 07/24/2019    CREATSERUM 0.82 07/24/2019     (H) 07/24/2019    CA 8.8 07/24/2019    ALKPHO 94 09/01/2012    ALT 39 09/01/2012    AST 26 09/01/2012    ALB 4.5 09/01/2012    TP 7.6 09/01/2012       Radiologic imaging reviewed at this visit:    Mammogram on 2/3/2021:  BREAST COMPOSITION:  Scattered areas fibroglandular density. FINDINGS:  Stable breast parenchymal pattern . No suspicious calcifications, architectural distortion, or spiculated masses are identified. Benign appearing calcifications. Impression   CONCLUSION:         BI-RADS CATEGORY:     DIAGNOSTIC CATEGORY 2--BENIGN FINDING:         RECOMMENDATIONS:     ROUTINE MAMMOGRAM AND CLINICAL EVALUATION IN 12 MONTHS. Bone Density Dexa on 5/9/2023:  LUMBAR SPINE ANALYSIS RESULTS:       Bone mineral density (BMD) (g/cm2):  1.078     Lumbar T-Score:  0.3       % young normals:  103       % age matched controls:  80       Change from prior spine examination:  NA                TOTAL HIP ANALYSIS RESULTS:         Bone mineral density (BMD) (g/cm2):  0.893       Total Hip T-Score:  -0.4       % young normals:  95       % age matched controls:  110       Change from prior hip examination:  NA                FEMORAL NECK ANALYSIS RESULTS:         Bone mineral density (BMD) (g/cm2):  0.756       Femoral neck T-Score:  -0.8       % young normals:  89       % age matched controls:  109       Change from prior hip examination:  NA               ADDITIONAL FINDINGS:  No significant additional findings. Impression   CONCLUSION:  Normal bone mineral density. SLN NM study on 7/2/2019:  FINDINGS:  SITE INJECTED:            Right breast periareolar region.     REGION IMAGED:         Right breast and chest.    IMAGING FINDINGS:     Radiotracer activity is noted at the injection site with lymphatic drainage to the sentinel lymph nodes. OTHER:             Negative. CONCLUSION: Successful lymphoscintigraphy procedure. Dictated by (CST): Jeffry Cam MD on 7/23/2019 at 18:01       Approved by (CST): Jeffry Cam MD on 7/23/2019 at 18:02        ADDENDUM:      Because patient speaks of Betza[place] Banks was used to discuss the procedure with the patient, obtain time-out, and answered patient's questions prior to the procedure. The patient, nuclear medicine technologist assisting with this exam, and myself were present during the discussion with the language-line. Assessment/Plan:     Right breast DCIS  Mastectomy on 7/24/2019  Negative margins  Grade II  0/2 SLN's  %  ID 90%    The patient had mastectomy for right breast DCIS. She is on Letrozole 2.5 mg daily for breast cancer prevention in the left breast. She is tolerating this. She will continue to follow at six month intervals. We will continue the endocrine therapy for a full five years. She will stop this in 7/2024. She was comfortable with this plan. I will see her in six months with plans to follow her yearly after that for mammogram follow up and breast exams. Left leg DVT in 12/2021  Provoked after CRISTHIAN/BSO  Occurred while on Tamoxifen    She has stopped tamoxifen. She had a provoked DVT. She finished a course of rivaroxaban. Monitor for symptoms.         Fabiola Cowden, MD

## 2024-05-20 ENCOUNTER — HOSPITAL ENCOUNTER (OUTPATIENT)
Dept: MAMMOGRAPHY | Facility: HOSPITAL | Age: 64
Discharge: HOME | End: 2024-05-20
Attending: INTERNAL MEDICINE
Payer: MEDICARE

## 2024-05-20 DIAGNOSIS — D05.11 DUCTAL CARCINOMA IN SITU (DCIS) OF RIGHT BREAST: ICD-10-CM

## 2024-05-20 PROCEDURE — 77063 BREAST TOMOSYNTHESIS BI: CPT | Performed by: INTERNAL MEDICINE

## 2024-05-20 PROCEDURE — 77067 SCR MAMMO BI INCL CAD: CPT | Performed by: INTERNAL MEDICINE

## 2024-06-19 ENCOUNTER — APPOINTMENT (OUTPATIENT)
Dept: HEMATOLOGY/ONCOLOGY | Facility: HOSPITAL | Age: 64
End: 2024-06-19
Attending: INTERNAL MEDICINE
Payer: MEDICARE

## 2024-07-10 ENCOUNTER — TELEPHONE (OUTPATIENT)
Dept: HEMATOLOGY/ONCOLOGY | Facility: HOSPITAL | Age: 64
End: 2024-07-10

## 2024-07-10 NOTE — TELEPHONE ENCOUNTER
Called patient to verify that she is taking Aspirin 81 mg. Patient's daughter stated that she is taking Aspirin 81 mg and it was advised by her PCP Dr Blackwell.

## 2024-07-10 NOTE — TELEPHONE ENCOUNTER
Called [facility] and let them know that patient's PCP is managing her Aspirin. Nurse at [facility] conveyed understanding and states she would contact Dr Blackwell's office.

## 2024-07-22 ENCOUNTER — OFFICE VISIT (OUTPATIENT)
Dept: HEMATOLOGY/ONCOLOGY | Facility: HOSPITAL | Age: 64
End: 2024-07-22
Attending: INTERNAL MEDICINE
Payer: MEDICARE

## 2024-07-22 VITALS
SYSTOLIC BLOOD PRESSURE: 126 MMHG | DIASTOLIC BLOOD PRESSURE: 76 MMHG | HEART RATE: 102 BPM | RESPIRATION RATE: 14 BRPM | BODY MASS INDEX: 28.26 KG/M2 | TEMPERATURE: 98 F | HEIGHT: 62.99 IN | OXYGEN SATURATION: 98 % | WEIGHT: 159.5 LBS

## 2024-07-22 DIAGNOSIS — D05.11 DUCTAL CARCINOMA IN SITU (DCIS) OF RIGHT BREAST: Primary | ICD-10-CM

## 2024-07-22 DIAGNOSIS — Z12.31 BREAST CANCER SCREENING BY MAMMOGRAM: ICD-10-CM

## 2024-07-22 DIAGNOSIS — I82.492 ACUTE DEEP VEIN THROMBOSIS (DVT) OF OTHER SPECIFIED VEIN OF LEFT LOWER EXTREMITY (HCC): ICD-10-CM

## 2024-07-22 PROCEDURE — 99213 OFFICE O/P EST LOW 20 MIN: CPT | Performed by: INTERNAL MEDICINE

## 2024-07-22 NOTE — PROGRESS NOTES
Patient here for 6 month f/u for breast cancer. Patient taking letrozole as directed with no complaints of side effects. Patient will have a breast exam today.     Education Record    Learner:  Patient and Family Member    Disease / Diagnosis: breast cancer    Barriers / Limitations:  None   Comments:    Method:  Discussion   Comments:    General Topics:  Medication, Side effects and symptom management, and Plan of care reviewed   Comments:    Outcome:  Shows understanding   Comments:

## 2024-07-22 NOTE — PROGRESS NOTES
Cancer Center Progress Note    Problem List:      Patient Active Problem List   Diagnosis    HTN (hypertension)    Hypercholesterolemia    Absence of breast, right    Breast cancer in situ    Ductal carcinoma in situ (DCIS) of right breast       Interim History:    Jackelin Sheldon presents today for evaluation and management of a diagnosis of DCIS of the right breast.    The patient has been doing well. She is followed for DCIS. She has been on letrozole. She had a h/o DVT while on tamoxifen. She had CRISTHIAN at [facility] in 12/2021. She was diagnosed with left leg DVT about 3 days after surgery.  She was treated with rivaroxaban 20 mg daily. She has no chest pain or dyspnea. She has no cough. She has no weight loss. She is tolerating the letrozole.     She has no hot flashes. She has no other pain. She has no other complaints.     Review of Systems:   Constitutional: Negative for anorexia, fatigue, fevers, chills, night sweats and weight loss.  Eyes: Negative for visual disturbance, irritation and redness.  Respiratory: Negative for cough, hemoptysis, chest pain, or dyspnea.  Cardiovascular: Negative for angina, orthopnea or palpitations.  Gastrointestinal: Negative for nausea, vomiting, change in bowel habits, diarrhea, constipation and abdominal pain.  Integument/breast: Negative for rash, skin lesions, and pruritus.  Hematologic/lymphatic: Negative for easy bruising, bleeding, and lymphadenopathy.  Genitourinary: Negative for dysuria or hematuria  Neurological: Negative for headaches, dizziness, speech problems, gait problems and focal weakness.  Psychiatric: The patient's mood was calm and appropriate for this visit.  The pertinent positives and negatives were described. All other systems were negative.    PMH/PSH:  Past Medical History:    Anesthesia complication    trouble breathing     Back problem    Cancer ([facility])    DCIS    Depression    Under tx/no meds    High blood pressure    High  cholesterol    Prolapsed uterus       Past Surgical History:   Procedure Laterality Date    Hysterectomy      Mastectomy right  06/2019    Oral surgery      Reduction left  06/2019    Left reduction       Family History Reviewed:  Family History   Problem Relation Age of Onset    Other (lung cancer) Mother 64    Breast Cancer Brother 59        dx age 59    Breast Cancer Self 58       Allergies:     Allergies   Allergen Reactions    Pork Derived Products NAUSEA AND VOMITING       Medications:   amLODIPine 5 MG Oral Tab       Meloxicam 15 MG Oral Tab Take 1 tablet (15 mg total) by mouth daily with food.      letrozole 2.5 MG Oral Tab       XARELTO 20 MG Oral Tab       donepezil 5 MG Oral Tab       escitalopram 10 MG Oral Tab       zolpidem 5 MG Oral Tab       Pravastatin Sodium 20 MG Oral Tab Take 1 Tab by mouth daily. 90 Tab 0    Lisinopril 20 MG Oral Tab Take 1 Tab by mouth daily. 90 Tab 0       Vital Signs:      Height: 160 cm (5' 2.99\") (07/22 1452)  Weight: 72.3 kg (159 lb 8 oz) (07/22 1452)  BSA (Calculated - sq m): 1.76 sq meters (07/22 1452)  Pulse: 102 (07/22 1452)  BP: 126/76 (07/22 1452)  Temp: 98.2 °F (36.8 °C) (07/22 1452)  Do Not Use - Resp Rate: --  SpO2: 98 % (07/22 1452)      Performance Status:  ECOG 0: Fully active, able to carry on all pre-disease performance without restriction     Physical Examination:    Constitutional: Patient is alert and  not in acute distress.  Respiratory: Clear to auscultation and percussion. No rales.  No wheezes.  Cardiovascular: Regular rate and rhythm. No murmurs.  Gastrointestinal: Soft, non tender with good bowel sounds.  Musculoskeletal: No edema. No calf tenderness. There is decreased ROM at the right shoulder.  Skin: No suspicious skin lesion, no rash, no ulceration.  Lymphatics: There is no palpable lymphadenopathy throughout in the cervical, supraclavicular, or axillary regions.  Psychiatric: The patient's mood is calm and appropriate for this visit.      Labs  reviewed at this visit:     Lab Results   Component Value Date    WBC 8.2 09/01/2012    MCV 82 09/01/2012    MCH 28.0 09/01/2012    MCHC 34.1 09/01/2012    RDW 13.8 09/01/2012     09/01/2012     Lab Results   Component Value Date     07/24/2019    K 4.0 07/24/2019     07/24/2019    CO2 22.0 07/24/2019    BUN 15 07/24/2019    CREATSERUM 0.82 07/24/2019     (H) 07/24/2019    CA 8.8 07/24/2019    ALKPHO 94 09/01/2012    ALT 39 09/01/2012    AST 26 09/01/2012    ALB 4.5 09/01/2012    TP 7.6 09/01/2012       Radiologic imaging reviewed at this visit:    Mammogram on 2/3/2021:  BREAST COMPOSITION:  Scattered areas fibroglandular density.       FINDINGS:  Stable breast parenchymal pattern . No suspicious calcifications, architectural distortion, or spiculated masses are identified.  Benign appearing calcifications.        Impression   CONCLUSION:         BI-RADS CATEGORY:     DIAGNOSTIC CATEGORY 2--BENIGN FINDING:         RECOMMENDATIONS:     ROUTINE MAMMOGRAM AND CLINICAL EVALUATION IN 12 MONTHS.          Bone Density Dexa on 5/9/2023:  LUMBAR SPINE ANALYSIS RESULTS:       Bone mineral density (BMD) (g/cm2):  1.078     Lumbar T-Score:  0.3       % young normals:  103       % age matched controls:  123       Change from prior spine examination:  NA                TOTAL HIP ANALYSIS RESULTS:         Bone mineral density (BMD) (g/cm2):  0.893       Total Hip T-Score:  -0.4       % young normals:  95       % age matched controls:  110       Change from prior hip examination:  NA                FEMORAL NECK ANALYSIS RESULTS:         Bone mineral density (BMD) (g/cm2):  0.756       Femoral neck T-Score:  -0.8       % young normals:  89       % age matched controls:  109       Change from prior hip examination:  NA               ADDITIONAL FINDINGS:  No significant additional findings.        Impression   CONCLUSION:  Normal bone mineral density.          [facility] study on 7/2/2019:  FINDINGS:  SITE  INJECTED:            Right breast periareolar region.    REGION IMAGED:         Right breast and chest.    IMAGING FINDINGS:     Radiotracer activity is noted at the injection site with lymphatic drainage to the sentinel lymph nodes.    OTHER:             Negative.       CONCLUSION: Successful lymphoscintigraphy procedure.          Dictated by (CST): Paulino Buchanan MD on 7/23/2019 at 18:01       Approved by (CST): Paulino Buchanan MD on 7/23/2019 at 18:02        ADDENDUM:      Because patient speaks of Gabonese, language-line was used to discuss the procedure with the patient, obtain time-out, and answered patient's questions prior to the procedure.  The patient, nuclear medicine technologist assisting with this exam, and myself were present during the discussion with the language-line.       Assessment/Plan:     Right breast DCIS  Mastectomy on 7/24/2019  Negative margins  Grade II  0/2 SLN's  %  IA 90%    The patient had mastectomy for right breast DCIS. She is on Letrozole 2.5 mg daily for breast cancer prevention in the left breast. She now has completed 5 years of therapy. I recommended that she stop the AI. She can now continue to follow with yearly left breast mammogram and breast exam. She can see me as needed.       Left leg DVT in 12/2021  Provoked after CRISTHIAN/BSO  Occurred while on Tamoxifen    She had a provoked DVT while on tamoxifen. She finished a course of rivaroxaban. Monitor for symptoms.        Ajay Atkinson MD

## 2024-07-23 ENCOUNTER — OFFICE VISIT (OUTPATIENT)
Dept: SURGERY | Facility: CLINIC | Age: 64
End: 2024-07-23
Payer: MEDICARE

## 2024-07-23 DIAGNOSIS — N64.89 OTHER SPECIFIED DISORDERS OF BREAST: ICD-10-CM

## 2024-07-23 DIAGNOSIS — Z90.11 ABSENCE OF BREAST, RIGHT: Primary | ICD-10-CM

## 2024-07-23 PROCEDURE — 99212 OFFICE O/P EST SF 10 MIN: CPT | Performed by: SURGERY

## 2024-07-23 NOTE — PROGRESS NOTES
Jackelin Sheldon is a 64 year old female who presents today who presents today for a yearly follow-up with her daughter. She she is without new complaints. Specifically, she denies any masses or skin changes.  She has a history of a right mastectomy and reconstruction with a Natrelle Inspira soft touch breast implant, style SSM, 485 cc in December 2019.  She underwent a contralateral balancing procedure.  Her last mammogram was in May 2024 and was negative.     Physical Examination:  HEENT: There is no cervical or supraclavicular lymphadenopathy noted.    Breasts: The right breast capsule is slightly tight to palpation.  There are no dominant right breast masses noted.  The left breast is without dominant masses.  There is no left nipple inversion or nipple discharge noted.  Well-healed Saul pattern scars are noted on the left breast.  There is no axillary lymphadenopathy noted bilaterally.  There is no erythema or seroma noted.     Assessment and Plan:  Patient is doing well.  We reviewed the recommended FDA surveillance protocol for silicone implants.  Given the age of the patient's implant, we discussed proceeding with breast implant imaging to evaluate for rupture.  We discussed continuing regular self breast examination with a plan for follow-up in 1 year or sooner if any changes are detected clinically or on imaging.  The plan was reviewed with the patient and daughter and questions were answered.

## 2025-05-24 ENCOUNTER — HOSPITAL ENCOUNTER (OUTPATIENT)
Dept: MAMMOGRAPHY | Facility: HOSPITAL | Age: 65
Discharge: HOME | End: 2025-05-24
Attending: INTERNAL MEDICINE
Payer: MEDICARE

## 2025-05-24 DIAGNOSIS — Z12.31 BREAST CANCER SCREENING BY MAMMOGRAM: ICD-10-CM

## 2025-05-24 DIAGNOSIS — D05.11 DUCTAL CARCINOMA IN SITU (DCIS) OF RIGHT BREAST: ICD-10-CM

## 2025-05-24 PROCEDURE — 77067 SCR MAMMO BI INCL CAD: CPT | Performed by: INTERNAL MEDICINE

## 2025-05-24 PROCEDURE — 77063 BREAST TOMOSYNTHESIS BI: CPT | Performed by: INTERNAL MEDICINE

## (undated) DEVICE — DRAPE SHEET LG

## (undated) DEVICE — INTENDED FOR TISSUE SEPARATION, AND OTHER PROCEDURES THAT REQUIRE A SHARP SURGICAL BLADE TO PUNCTURE OR CUT.: Brand: BARD-PARKER ® STAINLESS STEEL BLADES

## (undated) DEVICE — SUTURE PDS II 2-0 CT-2

## (undated) DEVICE — 1010 S-DRAPE TOWEL DRAPE 10/BX: Brand: STERI-DRAPE™

## (undated) DEVICE — 3M™ IOBAN™ 2 ANTIMICROBIAL INCISE DRAPE 6650EZ: Brand: IOBAN™ 2

## (undated) DEVICE — USE ITEM #176901

## (undated) DEVICE — 6 ML SYRINGE LUER-LOCK TIP: Brand: MONOJECT

## (undated) DEVICE — SUTURE PDS II 4-0 PS-2

## (undated) DEVICE — BLAKE SILICONE DRAIN, 15 FR ROUND, HUBLESS WITH 3/16" TROCAR: Brand: BLAKE

## (undated) DEVICE — SUTURE VICRYL 2-0 CT-1

## (undated) DEVICE — SUTURE VICRYL 3-0 SH

## (undated) DEVICE — GAMMEX® PI HYBRID SIZE 7.5, STERILE POWDER-FREE SURGICAL GLOVE, POLYISOPRENE AND NEOPRENE BLEND: Brand: GAMMEX

## (undated) DEVICE — MAJOR GENERAL: Brand: MEDLINE INDUSTRIES, INC.

## (undated) DEVICE — PEN: MARKING STD PT 100/CS: Brand: MEDICAL ACTION INDUSTRIES

## (undated) DEVICE — VIOLET BRAIDED (POLYGLACTIN 910), SYNTHETIC ABSORBABLE SUTURE: Brand: COATED VICRYL

## (undated) DEVICE — APPLIER LICACLIP MCA MED 23.8

## (undated) DEVICE — FLEXIBLE YANKAUER,MEDIUM TIP, NO VACUUM CONTROL: Brand: ARGYLE

## (undated) DEVICE — Device

## (undated) DEVICE — UNDYED BRAIDED (POLYGLACTIN 910), SYNTHETIC ABSORBABLE SUTURE: Brand: COATED VICRYL

## (undated) DEVICE — ABDOMINAL BINDER: Brand: DEROYAL

## (undated) DEVICE — TOWEL OR BLU 16X26 STRL

## (undated) DEVICE — 60 ML SYRINGE LUER-LOCK TIP: Brand: MONOJECT

## (undated) DEVICE — PROXIMATE SKIN STAPLERS (35 WIDE) CONTAINS 35 STAINLESS STEEL STAPLES (FIXED HEAD): Brand: PROXIMATE

## (undated) DEVICE — DERMABOND LIQUID ADHESIVE

## (undated) DEVICE — SUTURE SILK 0

## (undated) DEVICE — 3M™ STERI-STRIP™ REINFORCED ADHESIVE SKIN CLOSURES, R1548, 1 IN X 5 IN (25 MM X 125 MM), 4 STRIPS/ENVELOPE: Brand: 3M™ STERI-STRIP™

## (undated) DEVICE — SUTURE MONOCRYL 4-0 PS-2

## (undated) DEVICE — SUTURE PDS II 3-0 SH

## (undated) DEVICE — CONTAINER SPEC STR 4OZ GRY LID

## (undated) DEVICE — SUPER SPONGES,MEDIUM: Brand: KERLIX

## (undated) DEVICE — SUCTION CANISTER, 3000CC,SAFELINER: Brand: DEROYAL

## (undated) DEVICE — 3M™ BAIR HUGGER® UNDERBODY BLANKET, FULL ACCESS, 10 PER CASE 63500: Brand: BAIR HUGGER™

## (undated) DEVICE — PROXIMATE RH ROTATING HEAD SKIN STAPLERS (35 WIDE) CONTAINS 35 STAINLESS STEEL STAPLES: Brand: PROXIMATE

## (undated) DEVICE — MINOR GENERAL: Brand: MEDLINE INDUSTRIES, INC.

## (undated) DEVICE — DRESSING FOAM TOPIFOAM

## (undated) DEVICE — VEST SRG 3 MED CUP R/L

## (undated) DEVICE — SUTURE PLAIN GUT 5-0 PC-1

## (undated) DEVICE — SUTURE ETHIBOND EXCEL 2-0 SH

## (undated) DEVICE — CAUTERY BLADE 2IN INS E1455

## (undated) DEVICE — GOWN SURG AERO BLUE PERF LG

## (undated) DEVICE — SUTURE PROLENE 2-0 8533H

## (undated) DEVICE — SUTURE SILK 2-0 SH

## (undated) DEVICE — SPONGE LAP 18X18 XRAY STRL

## (undated) DEVICE — LIGACLIP MCA MULTIPLE CLIP APPLIERS, 20 MEDIUM CLIPS: Brand: LIGACLIP

## (undated) DEVICE — 9534HP TRANSPARENT DRSG W/FRAME: Brand: 3M™ TEGADERM™

## (undated) DEVICE — BANDAGE ROLL,100% COTTON, 6 PLY, LARGE: Brand: KERLIX

## (undated) DEVICE — OCCLUSIVE GAUZE STRIP,3% BISMUTH TRIBROMOPHENATE IN PETROLATUM BLEND: Brand: XEROFORM

## (undated) DEVICE — LIGACLIP MCA MULTIPLE CLIP APPLIERS, 20 SMALL CLIPS: Brand: LIGACLIP

## (undated) DEVICE — DRESSING BIOPATCH 1X4 CNTR

## (undated) DEVICE — SOL  .9 1000ML BTL

## (undated) DEVICE — DRAIN RESERVOIR RELIAVAC 100CC

## (undated) DEVICE — BLADE 11 SHRP BP SS SRG STRL

## (undated) DEVICE — INSULATED BLADE ELECTRODE 6.5

## (undated) DEVICE — SUTURE PROLENE 4-0 PS-2

## (undated) DEVICE — ASPIRATION TUBING SET, DISPOSABLE: Brand: MICROAIRE®

## (undated) DEVICE — SUTURE VICRYL 2-0 SH

## (undated) DEVICE — SUTURE ETHILON 3-0 669H

## (undated) DEVICE — CHLORAPREP 26ML APPLICATOR

## (undated) DEVICE — Device: Brand: MICROAIRE®

## (undated) NOTE — LETTER
[address], [place], [place]     AUTHORIZATION FOR SURGICAL OPERATION OR PROCEDURE    I hereby authorize Dr. Lux Iyer MD, my Physician(s) and whomever may be designated as the doctor's Assistant, to perform the follo 4. I consent to the photographing of procedure(s) to be performed for the purposes of advancing medicine, science and/or education, provided my identity is not revealed.  If the procedure has been videotaped, the physician/surgeon will obtain the original v (Witness signature)                                                                                                  (Date)                                (Time)  STATEMENT OF PHYSICIAN My signature below affirms that prior to the time of the procedure;  I

## (undated) NOTE — LETTER
Date: 9/11/2019    Patient Name: Marilynn Denise          To Whom it may concern: This letter has been written at the patient's request. The above patient was seen at the [facility] for treatment of a medical condition.     The patient ma

## (undated) NOTE — LETTER
[address] [place] [address] Rd, [place], [place]     AUTHORIZATION FOR SURGICAL OPERATION OR PROCEDURE    I hereby authorize Dr. Bertin Verdugo MD, my Physician(s) and whomever may be designated as the doctor's Assistant, to perform the foll Physician. 4. I consent to the photographing of procedure(s) to be performed for the purposes of advancing medicine, science and/or education, provided my identity is not revealed.  If the procedure has been videotaped, the physician/surgeon will obtain th (Witness signature)                                                                                                  (Date)                                (Time)  STATEMENT OF PHYSICIAN My signature below affirms that prior to the time of the procedure;  I

## (undated) NOTE — LETTER
[facility] [address], [place], [place]  [address]  INFORMED CONSENT FOR TRANSFUSION OF BLOOD OR BLOOD PRODUCTS  My physician has informed me of the nature, purpose, benefits and risks of transfusion for blood and blood components that ______________________________________________  (Signature of Patient)                                                            (Responsible party in case of Minor,

## (undated) NOTE — LETTER
[facility]  Administration of Anesthesia  1. Selam Shannon, or _________________________________ acting on her behalf, (Patient) (Dependent/Representative) request to receive anesthesia for my pending procedure/operation/treatment. infections, high spinal block, spinal bleeding, seizure, cardiac arrest and death. 7. AWARENESS: I understand that it is possible (but unlikely) to have explicit memory of events from the operating room while under general anesthesia.   8. ELECTROCONVULSIV unconscious pt /Relationship    My signature below affirms that prior to the time of the procedure, I have explained to the patient and/or his/her guardian, the risks and benefits of undergoing anesthesia, as well as any reasonable alternatives.     _______